# Patient Record
Sex: FEMALE | Race: BLACK OR AFRICAN AMERICAN | NOT HISPANIC OR LATINO | Employment: FULL TIME | URBAN - METROPOLITAN AREA
[De-identification: names, ages, dates, MRNs, and addresses within clinical notes are randomized per-mention and may not be internally consistent; named-entity substitution may affect disease eponyms.]

---

## 2021-03-08 PROBLEM — R42 DIZZINESS: Status: ACTIVE | Noted: 2021-03-08

## 2021-03-08 PROBLEM — G43.019 INTRACTABLE MIGRAINE WITHOUT AURA AND WITHOUT STATUS MIGRAINOSUS: Status: ACTIVE | Noted: 2021-03-08

## 2022-07-22 ENCOUNTER — HOSPITAL ENCOUNTER (EMERGENCY)
Facility: HOSPITAL | Age: 27
Discharge: HOME | End: 2022-07-22
Attending: STUDENT IN AN ORGANIZED HEALTH CARE EDUCATION/TRAINING PROGRAM
Payer: MEDICAID

## 2022-07-22 VITALS
WEIGHT: 161.63 LBS | TEMPERATURE: 98 F | RESPIRATION RATE: 16 BRPM | OXYGEN SATURATION: 99 % | SYSTOLIC BLOOD PRESSURE: 113 MMHG | HEIGHT: 64 IN | DIASTOLIC BLOOD PRESSURE: 79 MMHG | HEART RATE: 88 BPM | BODY MASS INDEX: 27.59 KG/M2

## 2022-07-22 DIAGNOSIS — R11.2 NON-INTRACTABLE VOMITING WITH NAUSEA, UNSPECIFIED VOMITING TYPE: ICD-10-CM

## 2022-07-22 DIAGNOSIS — R10.30 INTERMITTENT LOWER ABDOMINAL PAIN: Primary | ICD-10-CM

## 2022-07-22 LAB
ALBUMIN SERPL-MCNC: 4 GM/DL (ref 3.5–5)
ALBUMIN/GLOB SERPL: 1 RATIO (ref 1.1–2)
ALP SERPL-CCNC: 41 UNIT/L (ref 40–150)
ALT SERPL-CCNC: 18 UNIT/L (ref 0–55)
APPEARANCE UR: CLEAR
AST SERPL-CCNC: 15 UNIT/L (ref 5–34)
B-HCG UR QL: NEGATIVE
BACTERIA #/AREA URNS AUTO: ABNORMAL /HPF
BASOPHILS # BLD AUTO: 0.03 X10(3)/MCL (ref 0–0.2)
BASOPHILS NFR BLD AUTO: 0.6 %
BILIRUB UR QL STRIP.AUTO: NEGATIVE MG/DL
BILIRUBIN DIRECT+TOT PNL SERPL-MCNC: 0.5 MG/DL
BUN SERPL-MCNC: 10.6 MG/DL (ref 7–18.7)
CALCIUM SERPL-MCNC: 10.1 MG/DL (ref 8.4–10.2)
CHLORIDE SERPL-SCNC: 103 MMOL/L (ref 98–107)
CO2 SERPL-SCNC: 28 MMOL/L (ref 22–29)
COLOR UR AUTO: ABNORMAL
CREAT SERPL-MCNC: 0.76 MG/DL (ref 0.55–1.02)
CTP QC/QA: YES
EOSINOPHIL # BLD AUTO: 0.05 X10(3)/MCL (ref 0–0.9)
EOSINOPHIL NFR BLD AUTO: 0.9 %
ERYTHROCYTE [DISTWIDTH] IN BLOOD BY AUTOMATED COUNT: 14.6 % (ref 11.5–17)
GLOBULIN SER-MCNC: 3.9 GM/DL (ref 2.4–3.5)
GLUCOSE SERPL-MCNC: 82 MG/DL (ref 74–100)
GLUCOSE UR QL STRIP.AUTO: NORMAL MG/DL
HCT VFR BLD AUTO: 40.1 % (ref 37–47)
HGB BLD-MCNC: 12.8 GM/DL (ref 12–16)
HYALINE CASTS #/AREA URNS LPF: ABNORMAL /LPF
IMM GRANULOCYTES # BLD AUTO: 0 X10(3)/MCL (ref 0–0.04)
IMM GRANULOCYTES NFR BLD AUTO: 0 %
KETONES UR QL STRIP.AUTO: NEGATIVE MG/DL
LEUKOCYTE ESTERASE UR QL STRIP.AUTO: NEGATIVE UNIT/L
LIPASE SERPL-CCNC: 20 U/L
LYMPHOCYTES # BLD AUTO: 1.63 X10(3)/MCL (ref 0.6–4.6)
LYMPHOCYTES NFR BLD AUTO: 30.6 %
MCH RBC QN AUTO: 26 PG (ref 27–31)
MCHC RBC AUTO-ENTMCNC: 31.9 MG/DL (ref 33–36)
MCV RBC AUTO: 81.3 FL (ref 80–94)
MONOCYTES # BLD AUTO: 0.29 X10(3)/MCL (ref 0.1–1.3)
MONOCYTES NFR BLD AUTO: 5.5 %
NEUTROPHILS # BLD AUTO: 3.3 X10(3)/MCL (ref 2.1–9.2)
NEUTROPHILS NFR BLD AUTO: 62.4 %
NITRITE UR QL STRIP.AUTO: NEGATIVE
NRBC BLD AUTO-RTO: 0 %
PH UR STRIP.AUTO: 7 [PH]
PLATELET # BLD AUTO: 254 X10(3)/MCL (ref 130–400)
PMV BLD AUTO: 12.6 FL (ref 7.4–10.4)
POTASSIUM SERPL-SCNC: 4 MMOL/L (ref 3.5–5.1)
PROT SERPL-MCNC: 7.9 GM/DL (ref 6.4–8.3)
PROT UR QL STRIP.AUTO: NEGATIVE MG/DL
RBC # BLD AUTO: 4.93 X10(6)/MCL (ref 4.2–5.4)
RBC #/AREA URNS AUTO: ABNORMAL /HPF
RBC UR QL AUTO: NEGATIVE UNIT/L
SARS-COV-2 RDRP RESP QL NAA+PROBE: NEGATIVE
SODIUM SERPL-SCNC: 138 MMOL/L (ref 136–145)
SP GR UR STRIP.AUTO: 1.02
SQUAMOUS #/AREA URNS LPF: ABNORMAL /HPF
UROBILINOGEN UR STRIP-ACNC: NORMAL MG/DL
WBC # SPEC AUTO: 5.3 X10(3)/MCL (ref 4.5–11.5)
WBC #/AREA URNS AUTO: ABNORMAL /HPF

## 2022-07-22 PROCEDURE — 99284 EMERGENCY DEPT VISIT MOD MDM: CPT | Mod: 25

## 2022-07-22 PROCEDURE — 83690 ASSAY OF LIPASE: CPT | Performed by: PHYSICIAN ASSISTANT

## 2022-07-22 PROCEDURE — 81025 URINE PREGNANCY TEST: CPT | Performed by: PHYSICIAN ASSISTANT

## 2022-07-22 PROCEDURE — 63600175 PHARM REV CODE 636 W HCPCS: Performed by: PHYSICIAN ASSISTANT

## 2022-07-22 PROCEDURE — 80053 COMPREHEN METABOLIC PANEL: CPT | Performed by: PHYSICIAN ASSISTANT

## 2022-07-22 PROCEDURE — 25000003 PHARM REV CODE 250: Performed by: PHYSICIAN ASSISTANT

## 2022-07-22 PROCEDURE — 81001 URINALYSIS AUTO W/SCOPE: CPT | Performed by: PHYSICIAN ASSISTANT

## 2022-07-22 PROCEDURE — 85025 COMPLETE CBC W/AUTO DIFF WBC: CPT | Performed by: PHYSICIAN ASSISTANT

## 2022-07-22 PROCEDURE — 96372 THER/PROPH/DIAG INJ SC/IM: CPT | Performed by: PHYSICIAN ASSISTANT

## 2022-07-22 PROCEDURE — 36415 COLL VENOUS BLD VENIPUNCTURE: CPT | Performed by: PHYSICIAN ASSISTANT

## 2022-07-22 PROCEDURE — 87635 SARS-COV-2 COVID-19 AMP PRB: CPT | Performed by: PHYSICIAN ASSISTANT

## 2022-07-22 RX ORDER — DICYCLOMINE HYDROCHLORIDE 10 MG/1
10 CAPSULE ORAL 4 TIMES DAILY PRN
Qty: 20 CAPSULE | Refills: 0 | Status: SHIPPED | OUTPATIENT
Start: 2022-07-22

## 2022-07-22 RX ORDER — DICYCLOMINE HYDROCHLORIDE 10 MG/1
20 CAPSULE ORAL
Status: COMPLETED | OUTPATIENT
Start: 2022-07-22 | End: 2022-07-22

## 2022-07-22 RX ORDER — METOCLOPRAMIDE 10 MG/1
10 TABLET ORAL EVERY 6 HOURS PRN
Qty: 12 TABLET | Refills: 0 | Status: SHIPPED | OUTPATIENT
Start: 2022-07-22

## 2022-07-22 RX ORDER — METOCLOPRAMIDE HYDROCHLORIDE 5 MG/ML
10 INJECTION INTRAMUSCULAR; INTRAVENOUS
Status: COMPLETED | OUTPATIENT
Start: 2022-07-22 | End: 2022-07-22

## 2022-07-22 RX ADMIN — METOCLOPRAMIDE 10 MG: 5 INJECTION, SOLUTION INTRAMUSCULAR; INTRAVENOUS at 01:07

## 2022-07-22 RX ADMIN — DICYCLOMINE HYDROCHLORIDE 20 MG: 10 CAPSULE ORAL at 01:07

## 2022-07-22 NOTE — ED PROVIDER NOTES
Encounter Date: 2022       History     Chief Complaint   Patient presents with    Abdominal Pain    Generalized Body Aches     C/o lower abdominal pain and generalized body aches since sat. N/v  since sat.      Patient presents today c/o lower abdominal pain that started 1 week ago. Pain is intermittent. She also endorses nausea, vomiting, and generalized bodyaches. Last bm was this morning and was normal. She is tolerating PO. Denies fever, chills, diarrhea, constipation, dysuria, hematuria, vaginal discharge, vaginal bleeding, headache, uri symptoms.         Review of patient's allergies indicates:   Allergen Reactions    Ondansetron hcl Hives     Past Medical History:   Diagnosis Date    Anxiety     Headache      Past Surgical History:   Procedure Laterality Date     SECTION       Family History   Problem Relation Age of Onset    Cancer Maternal Aunt     Cancer Paternal Grandmother      Social History     Tobacco Use    Smoking status: Never Smoker    Smokeless tobacco: Never Used   Substance Use Topics    Alcohol use: Yes     Comment: social    Drug use: Never     Review of Systems   Constitutional: Negative for chills and fever.   Respiratory: Negative for chest tightness and shortness of breath.    Cardiovascular: Negative for chest pain, palpitations and leg swelling.   Gastrointestinal: Positive for abdominal pain, nausea and vomiting. Negative for constipation and diarrhea.   Genitourinary: Negative for dysuria, flank pain, hematuria, vaginal bleeding and vaginal discharge.   Musculoskeletal: Positive for arthralgias and myalgias. Negative for back pain.   Skin: Negative for rash.   Neurological: Negative for light-headedness and headaches.       Physical Exam     Initial Vitals [22 1155]   BP Pulse Resp Temp SpO2   130/84 86 20 97.5 °F (36.4 °C) 99 %      MAP       --         Physical Exam    Vitals reviewed.  Constitutional: She appears well-developed and well-nourished. She  is not diaphoretic. No distress.   HENT:   Head: Normocephalic and atraumatic.   Eyes: Conjunctivae and EOM are normal. Pupils are equal, round, and reactive to light.   Neck: Neck supple.   Normal range of motion.  Cardiovascular: Normal rate, regular rhythm, normal heart sounds and intact distal pulses.   Pulmonary/Chest: Breath sounds normal. No respiratory distress.   Abdominal: Abdomen is soft. Bowel sounds are normal. She exhibits no distension. There is no abdominal tenderness. There is no rebound and no guarding.   Musculoskeletal:         General: No edema. Normal range of motion.      Cervical back: Normal range of motion and neck supple.     Neurological: She is alert and oriented to person, place, and time. She has normal strength. GCS score is 15. GCS eye subscore is 4. GCS verbal subscore is 5. GCS motor subscore is 6.   Skin: Skin is warm and dry. Capillary refill takes less than 2 seconds. No rash noted.   Psychiatric: She has a normal mood and affect.         ED Course   Procedures  Labs Reviewed   COMPREHENSIVE METABOLIC PANEL - Abnormal; Notable for the following components:       Result Value    Globulin 3.9 (*)     Albumin/Globulin Ratio 1.0 (*)     All other components within normal limits   URINALYSIS, REFLEX TO URINE CULTURE - Abnormal; Notable for the following components:    Color, UA Light-Yellow (*)     Bacteria, UA Trace (*)     Squamous Epithelial Cells, UA Occ (*)     All other components within normal limits   CBC WITH DIFFERENTIAL - Abnormal; Notable for the following components:    MCH 26.0 (*)     MCHC 31.9 (*)     MPV 12.6 (*)     All other components within normal limits   LIPASE - Normal   SARS-COV-2 RNA AMPLIFICATION, QUAL - Normal   CBC W/ AUTO DIFFERENTIAL    Narrative:     The following orders were created for panel order CBC auto differential.  Procedure                               Abnormality         Status                     ---------                                -----------         ------                     CBC with Differential[[id number]]        Abnormal            Final result                 Please view results for these tests on the individual orders.   POCT URINE PREGNANCY          Imaging Results          X-Ray Abdomen Flat And Erect (Final result)  Result time 07/22/22 13:50:20    Final result by Reddy Armas MD (07/22/22 13:50:20)                 Impression:      Nonobstructive bowel gas pattern.      Electronically signed by: Reddy Armas  Date:    07/22/2022  Time:    13:50             Narrative:    EXAMINATION:  XR ABDOMEN FLAT AND ERECT    CLINICAL HISTORY:  Unspecified abdominal pain    TECHNIQUE:  Flat and erect AP views of the abdomen.    COMPARISON:  Radiography 09/21/2019    FINDINGS:  Clear lung bases.  No dilated bowel, significant air-fluid levels or definitive findings for pneumoperitoneum.  Small to moderate volume stool.                                 Medications   metoclopramide HCl injection 10 mg (10 mg Intramuscular Given 7/22/22 1308)   dicyclomine capsule 20 mg (20 mg Oral Given 7/22/22 1308)           APC / Resident Notes:   I was not physically present during the history, exam or disposition of this patient. I was available at all times for consultation. (Zmora)        ED Course as of 07/22/22 2220 Fri Jul 22, 2022   [id number] Patient re-evaluated at this time. She is resting comfortably in exam room. She says her pain and nausea have improved. No acute abdomen on exam. She is stable for discharge.  [SA]      ED Course User Index  [SA] CADE Sabillon             Clinical Impression:   Final diagnoses:  [R10.30] Intermittent lower abdominal pain (Primary)  [R11.2] Non-intractable vomiting with nausea, unspecified vomiting type        ED Disposition Condition    Discharge Stable        ED Prescriptions     Medication Sig Dispense Start Date End Date Auth. Provider    metoclopramide HCl (REGLAN) 10 MG tablet Take 1 tablet (10 mg  total) by mouth every 6 (six) hours as needed (nausea and vomiting). 12 tablet 7/22/2022  CADE Sabillon    dicyclomine (BENTYL) 10 MG capsule Take 1 capsule (10 mg total) by mouth 4 (four) times daily as needed (abdominal cramping). 20 capsule 7/22/2022  CADE Sabillon        Follow-up Information     Follow up With Specialties Details Why Contact Info    [facility] Dept Emergency Medicine  If symptoms worsen return to ED immediately [address] [facility] [address] [phone]    Primary Care Provider within 1-2 days  Go in 1 day             CADE Sabillon  07/22/22 1655       Messi Peacock MD  07/22/22 0074

## 2023-03-07 ENCOUNTER — HOSPITAL ENCOUNTER (EMERGENCY)
Facility: HOSPITAL | Age: 28
Discharge: HOME | End: 2023-03-07
Attending: STUDENT IN AN ORGANIZED HEALTH CARE EDUCATION/TRAINING PROGRAM
Payer: COMMERCIAL

## 2023-03-07 VITALS
TEMPERATURE: 99 F | BODY MASS INDEX: 28.51 KG/M2 | SYSTOLIC BLOOD PRESSURE: 146 MMHG | DIASTOLIC BLOOD PRESSURE: 98 MMHG | OXYGEN SATURATION: 99 % | HEIGHT: 64 IN | RESPIRATION RATE: 18 BRPM | WEIGHT: 167 LBS | HEART RATE: 91 BPM

## 2023-03-07 DIAGNOSIS — G89.29 CHRONIC PELVIC PAIN IN FEMALE: Primary | ICD-10-CM

## 2023-03-07 DIAGNOSIS — N83.201 CYSTS OF BOTH OVARIES: ICD-10-CM

## 2023-03-07 DIAGNOSIS — N83.202 CYSTS OF BOTH OVARIES: ICD-10-CM

## 2023-03-07 DIAGNOSIS — R10.2 CHRONIC PELVIC PAIN IN FEMALE: Primary | ICD-10-CM

## 2023-03-07 LAB
ALBUMIN SERPL-MCNC: 3.7 G/DL (ref 3.5–5)
ALBUMIN/GLOB SERPL: 1 RATIO (ref 1.1–2)
ALP SERPL-CCNC: 58 UNIT/L (ref 40–150)
ALT SERPL-CCNC: 16 UNIT/L (ref 0–55)
APPEARANCE UR: CLEAR
AST SERPL-CCNC: 14 UNIT/L (ref 5–34)
B-HCG SERPL QL: NEGATIVE
BACTERIA #/AREA URNS AUTO: NORMAL /HPF
BASOPHILS # BLD AUTO: 0.04 X10(3)/MCL (ref 0–0.2)
BASOPHILS NFR BLD AUTO: 0.6 %
BILIRUB UR QL STRIP.AUTO: NEGATIVE MG/DL
BILIRUBIN DIRECT+TOT PNL SERPL-MCNC: 0.4 MG/DL
BUN SERPL-MCNC: 10.1 MG/DL (ref 7–18.7)
CALCIUM SERPL-MCNC: 9.2 MG/DL (ref 8.4–10.2)
CHLORIDE SERPL-SCNC: 107 MMOL/L (ref 98–107)
CO2 SERPL-SCNC: 27 MMOL/L (ref 22–29)
COLOR UR AUTO: YELLOW
CREAT SERPL-MCNC: 0.82 MG/DL (ref 0.55–1.02)
EOSINOPHIL # BLD AUTO: 0.07 X10(3)/MCL (ref 0–0.9)
EOSINOPHIL NFR BLD AUTO: 1 %
ERYTHROCYTE [DISTWIDTH] IN BLOOD BY AUTOMATED COUNT: 15.4 % (ref 11.5–17)
GFR SERPLBLD CREATININE-BSD FMLA CKD-EPI: >60 MLS/MIN/1.73/M2
GLOBULIN SER-MCNC: 3.7 GM/DL (ref 2.4–3.5)
GLUCOSE SERPL-MCNC: 92 MG/DL (ref 74–100)
GLUCOSE UR QL STRIP.AUTO: NEGATIVE MG/DL
HCT VFR BLD AUTO: 38.7 % (ref 37–47)
HGB BLD-MCNC: 12.2 G/DL (ref 12–16)
IMM GRANULOCYTES # BLD AUTO: 0.02 X10(3)/MCL (ref 0–0.04)
IMM GRANULOCYTES NFR BLD AUTO: 0.3 %
KETONES UR QL STRIP.AUTO: NEGATIVE MG/DL
LEUKOCYTE ESTERASE UR QL STRIP.AUTO: NEGATIVE UNIT/L
LYMPHOCYTES # BLD AUTO: 2.12 X10(3)/MCL (ref 0.6–4.6)
LYMPHOCYTES NFR BLD AUTO: 29.5 %
MCH RBC QN AUTO: 25.9 PG
MCHC RBC AUTO-ENTMCNC: 31.5 G/DL (ref 33–36)
MCV RBC AUTO: 82.2 FL (ref 80–94)
MONOCYTES # BLD AUTO: 0.47 X10(3)/MCL (ref 0.1–1.3)
MONOCYTES NFR BLD AUTO: 6.5 %
NEUTROPHILS # BLD AUTO: 4.47 X10(3)/MCL (ref 2.1–9.2)
NEUTROPHILS NFR BLD AUTO: 62.1 %
NITRITE UR QL STRIP.AUTO: NEGATIVE
NRBC BLD AUTO-RTO: 0 %
PH UR STRIP.AUTO: 6.5 [PH]
PLATELET # BLD AUTO: 208 X10(3)/MCL (ref 130–400)
PMV BLD AUTO: 12.1 FL (ref 7.4–10.4)
POTASSIUM SERPL-SCNC: 3.8 MMOL/L (ref 3.5–5.1)
PROT SERPL-MCNC: 7.4 GM/DL (ref 6.4–8.3)
PROT UR QL STRIP.AUTO: NEGATIVE MG/DL
RBC # BLD AUTO: 4.71 X10(6)/MCL (ref 4.2–5.4)
RBC #/AREA URNS AUTO: <5 /HPF
RBC UR QL AUTO: NEGATIVE UNIT/L
SODIUM SERPL-SCNC: 139 MMOL/L (ref 136–145)
SP GR UR STRIP.AUTO: 1.01 (ref 1–1.03)
SQUAMOUS #/AREA URNS AUTO: <5 /HPF
UROBILINOGEN UR STRIP-ACNC: 1 MG/DL
WBC # SPEC AUTO: 7.2 X10(3)/MCL (ref 4.5–11.5)
WBC #/AREA URNS AUTO: <5 /HPF

## 2023-03-07 PROCEDURE — 81025 URINE PREGNANCY TEST: CPT | Performed by: PHYSICIAN ASSISTANT

## 2023-03-07 PROCEDURE — 80053 COMPREHEN METABOLIC PANEL: CPT | Performed by: PHYSICIAN ASSISTANT

## 2023-03-07 PROCEDURE — 96372 THER/PROPH/DIAG INJ SC/IM: CPT | Performed by: PHYSICIAN ASSISTANT

## 2023-03-07 PROCEDURE — 81001 URINALYSIS AUTO W/SCOPE: CPT | Performed by: PHYSICIAN ASSISTANT

## 2023-03-07 PROCEDURE — 63600175 PHARM REV CODE 636 W HCPCS: Performed by: PHYSICIAN ASSISTANT

## 2023-03-07 PROCEDURE — 85025 COMPLETE CBC W/AUTO DIFF WBC: CPT | Performed by: PHYSICIAN ASSISTANT

## 2023-03-07 PROCEDURE — 99285 EMERGENCY DEPT VISIT HI MDM: CPT | Mod: 25

## 2023-03-07 RX ORDER — HYDROCODONE BITARTRATE AND ACETAMINOPHEN 5; 325 MG/1; MG/1
1 TABLET ORAL EVERY 8 HOURS PRN
Qty: 6 TABLET | Refills: 0 | Status: SHIPPED | OUTPATIENT
Start: 2023-03-07 | End: 2023-03-09

## 2023-03-07 RX ORDER — KETOROLAC TROMETHAMINE 30 MG/ML
30 INJECTION, SOLUTION INTRAMUSCULAR; INTRAVENOUS
Status: COMPLETED | OUTPATIENT
Start: 2023-03-07 | End: 2023-03-07

## 2023-03-07 RX ADMIN — KETOROLAC TROMETHAMINE 30 MG: 30 INJECTION, SOLUTION INTRAMUSCULAR; INTRAVENOUS at 10:03

## 2023-03-07 NOTE — FIRST PROVIDER EVALUATION
"Medical screening examination initiated.  I have conducted a focused provider triage encounter, findings are as follows:    Brief history of present illness:  HPI     Abdominal Pain     Additional comments: Pt reports pelvic pain and generalized back pain x4   days. Has hx of cyst on bother ovaries and was told to come to ED. Pt   reports increase in discharge, denied vaginal bleeding. LMP 1/28/23 denied   BC or taking UPT          Last edited by Roxana Katz RN on 3/7/2023  4:40 PM.          Vitals:    03/07/23 1640   BP: (!) 146/98   Pulse: 91   Resp: 18   Temp: 98.6 °F (37 °C)   TempSrc: Oral   SpO2: 99%   Weight: 75.8 kg (167 lb)   Height: 5' 4" (1.626 m)       Pertinent physical exam:  AAOx4 female ambulatory into triage    Brief workup plan:  labs, UA    Preliminary workup initiated; this workup will be continued and followed by the physician or advanced practice provider that is assigned to the patient when roomed.  "

## 2023-03-08 NOTE — ED PROVIDER NOTES
Encounter Date: 3/7/2023       History     Chief Complaint   Patient presents with    Abdominal Pain     Pt reports pelvic pain and generalized back pain x4 days. Has hx of cyst on bother ovaries and was told to come to ED. Pt reports increase in discharge, denied vaginal bleeding. LMP 23 denied BC or taking UPT     27-year-old female with a history of ovarian cysts presents to the ED with bilateral pelvic pain radiating to lower back onset 4 days ago.  Patient states she is trying to see your OBGYN this coming Thursday however is having no relief at at-home Toradol.  Denies nausea, vomiting, dysuria, discharge, hematuria, fever, chills.  No known injury or trauma. LMP , states she is not regular     The history is provided by the patient. No  was used.   Review of patient's allergies indicates:   Allergen Reactions    Ondansetron hcl Hives     Past Medical History:   Diagnosis Date    Anxiety     Headache      Past Surgical History:   Procedure Laterality Date     SECTION       Family History   Problem Relation Age of Onset    Cancer Maternal Aunt     Cancer Paternal Grandmother      Social History     Tobacco Use    Smoking status: Never    Smokeless tobacco: Never   Substance Use Topics    Alcohol use: Yes     Comment: social    Drug use: Never     Review of Systems   Constitutional:  Negative for fever.   HENT:  Negative for sore throat.    Respiratory:  Negative for shortness of breath.    Cardiovascular:  Negative for chest pain.   Gastrointestinal:  Positive for abdominal pain. Negative for nausea and vomiting.   Genitourinary:  Positive for pelvic pain. Negative for dysuria and vaginal discharge.   Musculoskeletal:  Positive for back pain.   Skin:  Negative for rash.   Neurological:  Negative for weakness.   Hematological:  Does not bruise/bleed easily.   All other systems reviewed and are negative.    Physical Exam     Initial Vitals [23 1640]   BP Pulse Resp Temp  Referred by: Juma Jerome PT; Medical Diagnosis (from order):    Diagnosis Information      Diagnosis    726.10 (ICD-9-CM) - M77.8 (ICD-10-CM) - Tendinitis of left shoulder                Daily Treatment Note    Visit:  6     SUBJECTIVE                                                                                                             Patient comes in today stating that the shoulder has felt really good the past 24 hours. He had very little discomfort after the Last session and then returning to activities at school that day.   Functional Change: At practice on Monday he thru long toss with out any issues. Has resisted the urge to throw hard and has stayed off the mound since last Saturday. Continues to ice regularly and do HEP as well as arm care program    Pain / Symptoms:  Pain/symptom is: intermittent  Pain rating (out of 10): Current: 0 ; Best: 0  Progression since onset: improved    OBJECTIVE                                                                                                                     Observation:   Comments / Details: Continues to have tenderness over the long head of the tendon but more force has to be done to produce the tenderness.         TREATMENT                                                                                                                  Therapeutic Exercise:  UBE 4 min easy warm up  TRX stretching for anterior shoulder and chest  Biomechanical review and cueing of the overhead high speed motion of the arm  Lat pulldown 54# 3 x 10  Standing rows 10 # 3 x 10  Tubing extensions Blue 1 x25  DB prone horizontal extension 3#    Manual Therapy:  STM to the anterior shoulder with focus on the biceps tendons  Global stretching to shoulder      Skilled input: verbal instruction/cues, posture correction and tactile instruction/cues    Writer verbally educated and received verbal consent for hand placement, positioning of patient, and techniques to be performed  SpO2   (!) 146/98 91 18 98.6 °F (37 °C) 99 %      MAP       --         Physical Exam    Nursing note and vitals reviewed.  Constitutional: She appears well-developed and well-nourished.   HENT:   Head: Normocephalic and atraumatic.   Eyes: EOM are normal. Pupils are equal, round, and reactive to light.   Neck: Neck supple.   Cardiovascular:  Normal rate, regular rhythm and normal heart sounds.           Pulmonary/Chest: Breath sounds normal.   Abdominal: Abdomen is soft. Bowel sounds are normal. She exhibits no distension. There is abdominal tenderness in the right lower quadrant, suprapubic area and left lower quadrant. There is no rebound and no guarding.   Musculoskeletal:         General: Normal range of motion.      Cervical back: Normal and neck supple.      Thoracic back: Normal.      Lumbar back: Tenderness present. No spasms or bony tenderness. Normal range of motion.     Neurological: She is alert and oriented to person, place, and time. She has normal strength. GCS score is 15. GCS eye subscore is 4. GCS verbal subscore is 5. GCS motor subscore is 6.   Skin: Skin is warm and dry.   Psychiatric: She has a normal mood and affect.       ED Course   Procedures  Labs Reviewed   COMPREHENSIVE METABOLIC PANEL - Abnormal; Notable for the following components:       Result Value    Globulin 3.7 (*)     Albumin/Globulin Ratio 1.0 (*)     All other components within normal limits   CBC WITH DIFFERENTIAL - Abnormal; Notable for the following components:    MCHC 31.5 (*)     MPV 12.1 (*)     All other components within normal limits   URINALYSIS, REFLEX TO URINE CULTURE - Normal   PREGNANCY TEST, URINE RAPID - Normal   URINALYSIS, MICROSCOPIC - Normal   CBC W/ AUTO DIFFERENTIAL    Narrative:     The following orders were created for panel order CBC auto differential.  Procedure                               Abnormality         Status                     ---------                               -----------          today from patient for clothing adjustments for techniques and hand placement and palpation for techniques as described above and how they are pertinent to the patient's plan of care.    Home Exercise Program: Daily stretches and arm care program  Ice regularly   Continue to ease back into throwing 30-40 throws this week     ASSESSMENT                                                                                                             Patient has returned to activity/ADLs at 65% intensity  Decreased scapular endurance and iosolated inflammation of biceps (improving) limiting progression to higher intensity  Shoulder showing decrease in inflammation thus decreased pain.   He still does not have complete strength of the shoulder complex as of yet.  Pain/symptoms after session: 0    Patient Education:   Results of above outlined education: Verbalizes understanding and Demonstrates understanding      PLAN                                                                                                                           Suggestions for next session as indicated: Progress per plan of care: continue to build endurance I back into the shoulder. No hard throwing for one more week. May begin to cut down visits to 1 time a week over the next 2 weeks.    Complete strength testing assessment of shoulder complex via MMT and/or digital         Therapy procedure time and total treatment time can be found documented on the Time Entry flowsheet   ------                     CBC with Differential[[id number]]        Abnormal            Final result                 Please view results for these tests on the individual orders.          Imaging Results              US Pelvis Comp with Transvag NON-OB (xpd (Final result)  Result time 03/07/23 21:06:11      Final result by Aime Jackson MD (03/07/23 21:06:11)                   Impression:      1. Right ovarian multiple variable size cystic structures.  Due to multiplicity follow-up exam in 6 weeks shall be useful.    2. Left ovarian small physiologic cyst.      Electronically signed by: Aime Jackson  Date:    03/07/2023  Time:    21:06               Narrative:    EXAMINATION:  US PELVIS COMP WITH TRANSVAG NON-OB (XPD)    CLINICAL HISTORY:  pelvic pain;    TECHNIQUE:  Multiple real-time images were performed pelvis in various planes by the sonographer.    COMPARISON:  None available.    FINDINGS:  Uterus measures 6.3 x 3.9 x 5.2 cm.  Uterine myometrial echotexture is unremarkable.  Endometrial thickness of 4.1 mm is within normal limits.    Right ovary measures 3.2 x 1.6 x 3.2 cm and is remarkable for multiple cystic structures.  Right ovarian larger cystic structures measure 1.3 x 0.7 x 0.9 cm and 1.4 x 1.1 x 1.2 cm.  Unremarkable arteriovenous flow to the right ovary.    Left ovary measures 2.6 x 2.4 x 2.7 cm.  There is also left ovarian simple appearing cystic structure which measures 0.8 x 0.6 x 0.7 cm.  There is also unremarkable arteriovenous flow to the left ovary.    No free fluid within the pelvis.                                       Medications   ketorolac injection 30 mg (30 mg Intramuscular Given 3/7/23 2223)     Medical Decision Making:   History:   Old Medical Records: I decided to obtain old medical records.  Old Records Summarized: records from another hospital and other records.       <> Summary of Records: Multiple previous ER in clinic visits over the last several years for similar or exact same  "complaints.  Initial Assessment:   27-year-old female with a history of ovarian cysts presents to the ED with bilateral pelvic pain radiating to lower back onset 4 days ago.  Patient states she is trying to see your OBGYN this coming Thursday however is having no relief at at-home Toradol.  Denies nausea, vomiting, dysuria, discharge, hematuria, fever, chills.  No known injury or trauma. LMP 1/28, states she is not regular   Differential Diagnosis:   UTI, ovarian cyst versus ovarian torsion, nephrolithiasis, pyelonephritis, muscle strain  Clinical Tests:   Lab Tests: Reviewed and Ordered  Radiological Study: Reviewed and Ordered  ED Management:  Labs unremarkable.  Ultrasound noting bilateral ovarian cyst however no other abnormalities.  Given Toradol here in the ED.  Has appointment to see her OBGYN this coming Thursday.  Will send home with very short course of Norco however in light of this being a chronic issue, I did instruct her to follow up with the OBGYN for further recommendations.           ED Course as of 03/07/23 2252   Tue Mar 07, 2023   2244 Upon discharge, patient refusing to leave the room until "I am out of pain."  I discussed with her that I sent her a few tablets of Ashley to her pharmacy for her to take at home for severe pain.  I discussed with her that this is a chronic issue she has been seen for multiple times over the last several years.  I discussed that we know with the issue is however I can not fix her ovarian cysts here in the ED today and that she needs to follow up with her OBGYN for a solution.  Patient is adamant and angry that she has been here for "hours in pain. " I tried to discuss with her that we are very busy and we are moving as fast as possible to help make sure she and all the other patients are taken care of in a safe and timely fashion.  I did reiterate again that all of her labs, urine and ultrasound were unremarkable for any surgical emergency or infection.  I did " discuss that they again saw her ovarian cysts however no other signs of infection or surgical emergency.  Patient on the phone with someone, had them on speaker during the entire discussion.  Jami RN, at bedside as well.  Patient asking for discharge paperwork and leaving. [MA]      ED Course User Index  [MA] Montrell Patiño PA-C                 Clinical Impression:   Final diagnoses:  [R10.2, G89.29] Chronic pelvic pain in female (Primary)  [N83.201, N83.202] Cysts of both ovaries        ED Disposition Condition    Discharge Stable          ED Prescriptions       Medication Sig Dispense Start Date End Date Auth. Provider    HYDROcodone-acetaminophen (NORCO) 5-325 mg per tablet Take 1 tablet by mouth every 8 (eight) hours as needed for Pain. 6 tablet 3/7/2023 3/9/2023 Montrell Patiño PA-C          Follow-up Information       Follow up With Specialties Details Why Contact Info    Jenna Burdick MD Obstetrics and Gynecology On 3/9/2023  [address] [facility] [address]  [phone]      [facility] - Emergency Dept Emergency Medicine In 1 week If symptoms worsen [address] [facility] [address] [phone]             Montrell Patiño PA-C  03/07/23 2237       Montrell Patiño PA-C  03/07/23 2238       Montrell Patiño PA-C  03/07/23 1958

## 2024-08-25 ENCOUNTER — HOSPITAL ENCOUNTER (EMERGENCY)
Facility: HOSPITAL | Age: 29
Discharge: HOME | End: 2024-08-25
Attending: EMERGENCY MEDICINE
Payer: MEDICAID

## 2024-08-25 VITALS
HEIGHT: 64 IN | WEIGHT: 187.25 LBS | RESPIRATION RATE: 18 BRPM | BODY MASS INDEX: 31.97 KG/M2 | OXYGEN SATURATION: 99 % | DIASTOLIC BLOOD PRESSURE: 85 MMHG | TEMPERATURE: 98 F | HEART RATE: 88 BPM | SYSTOLIC BLOOD PRESSURE: 137 MMHG

## 2024-08-25 DIAGNOSIS — R10.9 ABDOMINAL PAIN, UNSPECIFIED ABDOMINAL LOCATION: Primary | ICD-10-CM

## 2024-08-25 LAB
ABS NEUT CALC (OHS): 8.3 X10(3)/MCL (ref 2.1–9.2)
ANION GAP SERPL CALC-SCNC: 8 MEQ/L
B-HCG UR QL: NEGATIVE
BACTERIA #/AREA URNS AUTO: ABNORMAL /HPF
BASOPHILS NFR BLD MANUAL: 0.11 X10(3)/MCL (ref 0–0.2)
BASOPHILS NFR BLD MANUAL: 1 % (ref 0–2)
BILIRUB UR QL STRIP.AUTO: NEGATIVE
BUN SERPL-MCNC: 11.7 MG/DL (ref 7–18.7)
CALCIUM SERPL-MCNC: 9.7 MG/DL (ref 8.4–10.2)
CHLORIDE SERPL-SCNC: 105 MMOL/L (ref 98–107)
CLARITY UR: CLEAR
CO2 SERPL-SCNC: 24 MMOL/L (ref 22–29)
COLOR UR AUTO: ABNORMAL
CREAT SERPL-MCNC: 1.03 MG/DL (ref 0.55–1.02)
CREAT/UREA NIT SERPL: 11
CTP QC/QA: YES
ERYTHROCYTE [DISTWIDTH] IN BLOOD BY AUTOMATED COUNT: 14.3 % (ref 11.5–17)
GFR SERPLBLD CREATININE-BSD FMLA CKD-EPI: >60 ML/MIN/1.73/M2
GLUCOSE SERPL-MCNC: 103 MG/DL (ref 74–100)
GLUCOSE UR QL STRIP: NORMAL
HCT VFR BLD AUTO: 38.6 % (ref 37–47)
HGB BLD-MCNC: 12.6 G/DL (ref 12–16)
HGB UR QL STRIP: NEGATIVE
HOLD SPECIMEN: NORMAL
HYALINE CASTS #/AREA URNS LPF: ABNORMAL /LPF
KETONES UR QL STRIP: ABNORMAL
LEUKOCYTE ESTERASE UR QL STRIP: NEGATIVE
LYMPHOCYTES NFR BLD MANUAL: 1.83 X10(3)/MCL
LYMPHOCYTES NFR BLD MANUAL: 17 % (ref 13–40)
MAGNESIUM SERPL-MCNC: 2 MG/DL (ref 1.6–2.6)
MCH RBC QN AUTO: 25.6 PG (ref 27–31)
MCHC RBC AUTO-ENTMCNC: 32.6 G/DL (ref 33–36)
MCV RBC AUTO: 78.5 FL (ref 80–94)
MONOCYTES NFR BLD MANUAL: 0.54 X10(3)/MCL (ref 0.1–1.3)
MONOCYTES NFR BLD MANUAL: 5 % (ref 2–11)
MUCOUS THREADS URNS QL MICRO: ABNORMAL /LPF
NEUTROPHILS NFR BLD MANUAL: 76 % (ref 47–80)
NEUTS BAND NFR BLD MANUAL: 1 % (ref 0–11)
NITRITE UR QL STRIP: NEGATIVE
NRBC BLD AUTO-RTO: 0 %
PH UR STRIP: 6.5 [PH]
PLATELET # BLD AUTO: 276 X10(3)/MCL (ref 130–400)
PLATELET # BLD EST: ADEQUATE 10*3/UL
PMV BLD AUTO: 12.2 FL (ref 7.4–10.4)
POTASSIUM SERPL-SCNC: 4 MMOL/L (ref 3.5–5.1)
PROT UR QL STRIP: ABNORMAL
RBC # BLD AUTO: 4.92 X10(6)/MCL (ref 4.2–5.4)
RBC #/AREA URNS AUTO: ABNORMAL /HPF
RBC MORPH BLD: NORMAL
SODIUM SERPL-SCNC: 137 MMOL/L (ref 136–145)
SP GR UR STRIP.AUTO: 1.04 (ref 1–1.03)
SQUAMOUS #/AREA URNS LPF: ABNORMAL /HPF
UROBILINOGEN UR STRIP-ACNC: ABNORMAL
WBC # BLD AUTO: 10.78 X10(3)/MCL (ref 4.5–11.5)
WBC #/AREA URNS AUTO: ABNORMAL /HPF

## 2024-08-25 PROCEDURE — 81001 URINALYSIS AUTO W/SCOPE: CPT | Performed by: EMERGENCY MEDICINE

## 2024-08-25 PROCEDURE — 80048 BASIC METABOLIC PNL TOTAL CA: CPT | Performed by: EMERGENCY MEDICINE

## 2024-08-25 PROCEDURE — 85027 COMPLETE CBC AUTOMATED: CPT | Performed by: EMERGENCY MEDICINE

## 2024-08-25 PROCEDURE — 83735 ASSAY OF MAGNESIUM: CPT | Performed by: EMERGENCY MEDICINE

## 2024-08-25 PROCEDURE — 99285 EMERGENCY DEPT VISIT HI MDM: CPT | Mod: 25

## 2024-08-25 PROCEDURE — 25500020 PHARM REV CODE 255

## 2024-08-25 PROCEDURE — 81025 URINE PREGNANCY TEST: CPT | Performed by: EMERGENCY MEDICINE

## 2024-08-25 RX ADMIN — IOHEXOL 100 ML: 350 INJECTION, SOLUTION INTRAVENOUS at 07:08

## 2024-08-25 NOTE — ED PROVIDER NOTES
"ED PROVIDER NOTE  2024    CHIEF COMPLAINT:   Chief Complaint   Patient presents with    Abdominal Pain     Lower abd pain and pelvic "pulling" type of pain since this morning. Denies n/v/d/urinary complaints.        HISTORY OF PRESENT ILLNESS:   Chinyere Osorio is a 29 y.o. female who presents with chief complaint Abdominal pain.  Onset was today whenever she began having suprapubic and left lower quadrant abdominal pain that has constant aggravated with walking feels like a sharp stabbing pain each time she steps onto her left foot states she feels like she has to poop because she can feel the pain in her rectum.  Denies nausea, vomiting, diarrhea, constipation, abnormal vaginal bleeding or discharge, dysuria, hematuria.    The history is provided by the patient.         REVIEW OF SYSTEMS: as noted in the HPI.  NURSING NOTES REVIEWED      PAST MEDICAL/SURGICAL HISTORY:   Past Medical History:   Diagnosis Date    Anxiety     Headache       Past Surgical History:   Procedure Laterality Date     SECTION         FAMILY HISTORY:   Family History   Problem Relation Name Age of Onset    Cancer Maternal Aunt      Cancer Paternal Grandmother         SOCIAL HISTORY:   Social History     Tobacco Use    Smoking status: Never    Smokeless tobacco: Never   Substance Use Topics    Alcohol use: Yes     Comment: social    Drug use: Never       ALLERGIES:   Review of patient's allergies indicates:   Allergen Reactions    Ondansetron hcl Hives       PHYSICAL EXAM:  Initial Vitals [24 1740]   BP Pulse Resp Temp SpO2   134/83 90 18 97.6 °F (36.4 °C) 100 %      MAP       --         Physical Exam    Nursing note and vitals reviewed.  Constitutional: She appears well-developed and well-nourished.   HENT:   Head: Normocephalic and atraumatic.   Mouth/Throat: Uvula is midline and mucous membranes are normal.   Eyes: EOM are normal. Pupils are equal, round, and reactive to light.   Neck: Trachea normal. Neck supple. "   Cardiovascular:  Normal rate, regular rhythm and normal pulses.           Pulmonary/Chest: Effort normal and breath sounds normal.   Abdominal: Abdomen is soft. Bowel sounds are normal. There is abdominal tenderness in the suprapubic area and left lower quadrant. There is no rebound and no guarding.   Musculoskeletal:         General: Normal range of motion.      Cervical back: Neck supple.     Neurological: She is alert and oriented to person, place, and time. GCS eye subscore is 4. GCS verbal subscore is 5. GCS motor subscore is 6.   Skin: Skin is warm and dry.   Psychiatric: She has a normal mood and affect. Her speech is normal. Thought content normal.         RESULTS:  Labs Reviewed   BASIC METABOLIC PANEL - Abnormal       Result Value    Sodium 137      Potassium 4.0      Chloride 105      CO2 24      Glucose 103 (*)     Blood Urea Nitrogen 11.7      Creatinine 1.03 (*)     BUN/Creatinine Ratio 11      Calcium 9.7      Anion Gap 8.0      eGFR >60     URINALYSIS, REFLEX TO URINE CULTURE - Abnormal    Color, UA Light-Yellow      Appearance, UA Clear      Specific Gravity, UA 1.040 (*)     pH, UA 6.5      Protein, UA 1+ (*)     Glucose, UA Normal      Ketones, UA Trace (*)     Blood, UA Negative      Bilirubin, UA Negative      Urobilinogen, UA 1+ (*)     Nitrites, UA Negative      Leukocyte Esterase, UA Negative      RBC, UA 0-5      WBC, UA 0-5      Bacteria, UA None Seen      Squamous Epithelial Cells, UA Few (*)     Mucous, UA Occ (*)     Hyaline Casts, UA None Seen     CBC WITH DIFFERENTIAL - Abnormal    WBC 10.78      RBC 4.92      Hgb 12.6      Hct 38.6      MCV 78.5 (*)     MCH 25.6 (*)     MCHC 32.6 (*)     RDW 14.3      Platelet 276      MPV 12.2 (*)     NRBC% 0.0     MAGNESIUM - Normal    Magnesium Level 2.00     MANUAL DIFFERENTIAL - Normal    Neutrophils % 76      Bands % 1      Lymphs % 17      Monocytes % 5      Basophils % 1      Neutrophils Abs Calc 8.3006      Basophils Abs 0.1078      Lymphs  Abs 1.8326      Monocytes Abs 0.539      Platelets Adequate      RBC Morph Normal     CBC W/ AUTO DIFFERENTIAL    Narrative:     The following orders were created for panel order CBC auto differential.  Procedure                               Abnormality         Status                     ---------                               -----------         ------                     CBC with Differential[[id number]]       Abnormal            Final result               Manual Differential[[id number]]         Normal              Final result                 Please view results for these tests on the individual orders.   EXTRA TUBES    Narrative:     The following orders were created for panel order EXTRA TUBES.  Procedure                               Abnormality         Status                     ---------                               -----------         ------                     Light Blue Top Hold[[id number]]                             Final result               Lavender Top Hold[[id number]]                               Final result               Gold Top Hold[[id number]]                                   Final result                 Please view results for these tests on the individual orders.   LIGHT BLUE TOP HOLD    Extra Tube Hold for add-ons.     LAVENDER TOP HOLD    Extra Tube Hold for add-ons.     GOLD TOP HOLD    Extra Tube Hold for add-ons.     POCT URINE PREGNANCY    POC Preg Test, Ur Negative       Acceptable Yes       Imaging Results              CT Abdomen Pelvis With IV Contrast NO Oral Contrast (Final result)  Result time 08/25/24 19:12:21      Final result by Rigo Lozada MD (08/25/24 19:12:21)                   Impression:      Diverticulosis without evidence of acute diverticulitis    No acute abnormalities are seen    Hiatal hernia      Electronically signed by: Rigo Lozada MD  Date:    08/25/2024  Time:    19:12               Narrative:    EXAMINATION:  CT ABDOMEN PELVIS WITH IV  CONTRAST    CLINICAL HISTORY:  LLQ abdominal pain;    TECHNIQUE:  Low dose axial images, sagittal and coronal reformations were obtained from the lung bases to the pubic symphysis following the IV administration of 100 mL of Omnipaque 350 no oral contrast is given.    Automatic exposure control (AEC) was utilized for dose reduction.    Dose: 390 the mGycm    COMPARISON:  None.    FINDINGS:  Lung bases appear clear.  There is a hiatal hernia present.  Liver appears normal.  Spleen appears normal.  Pancreas appears normal.  Biliary system appears normal.  The adrenals are not enlarged.  Kidneys appear normal.  Aorta shows no evidence of an aneurysm.  The appendix appears normal.  Uterus appears normal.  There are diverticulum in the colon without evidence of acute diverticulitis                                      PROCEDURES:  Procedures    ECG:       ED COURSE AND MEDICAL DECISION MAKING:  Medications   iohexoL (OMNIPAQUE 350) 350 mg iodine/mL injection (100 mLs  Given 8/25/24 1901)     ED Course as of 08/25/24 2221   Sun Aug 25, 2024   1814 NITRITE UA: Negative [IB]   1814 Leukocyte Esterase, UA: Negative [IB]   1814 RBC, UA: 0-5 [IB]   1814 WBC, UA: 0-5 [IB]   1814 Bacteria, UA: None Seen [IB]   1814 hCG Qualitative, Urine: Negative [IB]   1817 WBC: 10.78 [IB]   1817 Hemoglobin: 12.6 [IB]   1817 Platelet Count: 276 [IB]   1841 WBC: 10.78 [IB]   1841 Hemoglobin: 12.6 [IB]   1841 Platelet Count: 276 [IB]   2217 Reassuring hemogram; [CT]   2217 Reassuring chemistries; [CT]      ED Course User Index  [CT] Erasmo Becker MD  [IB] Clayton James, DO        Medical Decision Making  Amount and/or Complexity of Data Reviewed  Labs: ordered. Decision-making details documented in ED Course.  Radiology: ordered.    Risk  Risk Details: Received in sign-out at 7:00 p.m. today at which time lab data and CT remained pending.  The data have subsequently been resulted in found reassuring.  Patient is discharged as  anticipated in stable condition.  She will continue outpatient evaluation of the ongoing symptom.        CLINICAL IMPRESSION:  1. Abdominal pain, unspecified abdominal location        DISPOSITION:   ED Disposition Condition    Discharge Stable            ED Prescriptions    None       Follow-up Information       Follow up With Specialties Details Why Contact Info    [facility] - Emergency Dept Emergency Medicine  As needed, If symptoms worsen [address] [facility] [id number] [phone]               Theemiliano, Erasmo MAYS MD  08/25/24 4870

## 2024-08-25 NOTE — Clinical Note
"Chinyere Pastranamaria Osorio was seen and treated in our emergency department on 8/25/2024.  She may return to work on 08/27/2024.       If you have any questions or concerns, please don't hesitate to call.       RN    "

## 2025-04-08 ENCOUNTER — HOSPITAL ENCOUNTER (OUTPATIENT)
Facility: HOSPITAL | Age: 30
Discharge: HOME | DRG: 833 | End: 2025-04-09
Attending: OBSTETRICS & GYNECOLOGY | Admitting: OBSTETRICS & GYNECOLOGY
Payer: MEDICAID

## 2025-04-08 DIAGNOSIS — O21.0 HYPEREMESIS GRAVIDARUM: ICD-10-CM

## 2025-04-08 DIAGNOSIS — R11.10 HYPEREMESIS: ICD-10-CM

## 2025-04-08 DIAGNOSIS — O21.0 HYPEREMESIS GRAVIDARUM: Primary | ICD-10-CM

## 2025-04-08 LAB
ABORH RETYPE: NORMAL
ALBUMIN SERPL-MCNC: 2.9 G/DL (ref 3.5–5)
ALBUMIN/GLOB SERPL: 0.7 RATIO (ref 1.1–2)
ALP SERPL-CCNC: 46 UNIT/L (ref 40–150)
ALT SERPL-CCNC: 22 UNIT/L (ref 0–55)
ANION GAP SERPL CALC-SCNC: 10 MEQ/L
AST SERPL-CCNC: 17 UNIT/L (ref 11–45)
B-HCG FREE SERPL-ACNC: ABNORMAL MIU/ML
BACTERIA #/AREA URNS AUTO: ABNORMAL /HPF
BASOPHILS # BLD AUTO: 0.01 X10(3)/MCL
BASOPHILS NFR BLD AUTO: 0.2 %
BILIRUB DIRECT SERPL-MCNC: 0.1 MG/DL (ref 0–?)
BILIRUB SERPL-MCNC: 0.3 MG/DL
BILIRUB UR QL STRIP.AUTO: NEGATIVE
BUN SERPL-MCNC: 3.9 MG/DL (ref 7–18.7)
CALCIUM SERPL-MCNC: 9.1 MG/DL (ref 8.4–10.2)
CHLORIDE SERPL-SCNC: 105 MMOL/L (ref 98–107)
CLARITY UR: CLEAR
CO2 SERPL-SCNC: 21 MMOL/L (ref 22–29)
COLOR UR AUTO: ABNORMAL
CREAT SERPL-MCNC: 0.57 MG/DL (ref 0.55–1.02)
CREAT/UREA NIT SERPL: 7
EOSINOPHIL # BLD AUTO: 0.1 X10(3)/MCL (ref 0–0.9)
EOSINOPHIL NFR BLD AUTO: 1.8 %
ERYTHROCYTE [DISTWIDTH] IN BLOOD BY AUTOMATED COUNT: 14.3 % (ref 11.5–17)
GFR SERPLBLD CREATININE-BSD FMLA CKD-EPI: >60 ML/MIN/1.73/M2
GLOBULIN SER-MCNC: 4.1 GM/DL (ref 2.4–3.5)
GLUCOSE SERPL-MCNC: 118 MG/DL (ref 74–100)
GLUCOSE UR QL STRIP: NORMAL
GROUP & RH: NORMAL
HBV SURFACE AG SERPL QL IA: NONREACTIVE
HCT VFR BLD AUTO: 35.6 % (ref 37–47)
HGB BLD-MCNC: 11.4 G/DL (ref 12–16)
HGB UR QL STRIP: NEGATIVE
HIV 1+2 AB+HIV1 P24 AG SERPL QL IA: NONREACTIVE
IMM GRANULOCYTES # BLD AUTO: 0.02 X10(3)/MCL (ref 0–0.04)
IMM GRANULOCYTES NFR BLD AUTO: 0.4 %
INDIRECT COOMBS: NORMAL
KETONES UR QL STRIP: NEGATIVE
LEUKOCYTE ESTERASE UR QL STRIP: NEGATIVE
LYMPHOCYTES # BLD AUTO: 1.74 X10(3)/MCL (ref 0.6–4.6)
LYMPHOCYTES NFR BLD AUTO: 31.5 %
MCH RBC QN AUTO: 24.1 PG (ref 27–31)
MCHC RBC AUTO-ENTMCNC: 32 G/DL (ref 33–36)
MCV RBC AUTO: 75.1 FL (ref 80–94)
MONOCYTES # BLD AUTO: 0.27 X10(3)/MCL (ref 0.1–1.3)
MONOCYTES NFR BLD AUTO: 4.9 %
MUCOUS THREADS URNS QL MICRO: ABNORMAL /LPF
NEUTROPHILS # BLD AUTO: 3.39 X10(3)/MCL (ref 2.1–9.2)
NEUTROPHILS NFR BLD AUTO: 61.2 %
NITRITE UR QL STRIP: NEGATIVE
NRBC BLD AUTO-RTO: 0 %
PH UR STRIP: 6 [PH]
PLATELET # BLD AUTO: 180 X10(3)/MCL (ref 130–400)
PMV BLD AUTO: 11.2 FL (ref 7.4–10.4)
POTASSIUM SERPL-SCNC: 3.1 MMOL/L (ref 3.5–5.1)
PROT SERPL-MCNC: 7 GM/DL (ref 6.4–8.3)
PROT UR QL STRIP: NEGATIVE
RBC # BLD AUTO: 4.74 X10(6)/MCL (ref 4.2–5.4)
RBC #/AREA URNS AUTO: ABNORMAL /HPF
SODIUM SERPL-SCNC: 136 MMOL/L (ref 136–145)
SP GR UR STRIP.AUTO: 1.01 (ref 1–1.03)
SPECIMEN OUTDATE: NORMAL
SQUAMOUS #/AREA URNS LPF: ABNORMAL /HPF
T PALLIDUM AB SER QL: NONREACTIVE
UROBILINOGEN UR STRIP-ACNC: NORMAL
WBC # BLD AUTO: 5.53 X10(3)/MCL (ref 4.5–11.5)
WBC #/AREA URNS AUTO: ABNORMAL /HPF

## 2025-04-08 PROCEDURE — 87389 HIV-1 AG W/HIV-1&-2 AB AG IA: CPT | Performed by: OBSTETRICS & GYNECOLOGY

## 2025-04-08 PROCEDURE — 25000003 PHARM REV CODE 250: Performed by: NURSE PRACTITIONER

## 2025-04-08 PROCEDURE — 86780 TREPONEMA PALLIDUM: CPT | Performed by: OBSTETRICS & GYNECOLOGY

## 2025-04-08 PROCEDURE — 25000242 PHARM REV CODE 250 ALT 637 W/ HCPCS: Performed by: NURSE PRACTITIONER

## 2025-04-08 PROCEDURE — 82239 BILE ACIDS TOTAL: CPT | Performed by: OBSTETRICS & GYNECOLOGY

## 2025-04-08 PROCEDURE — G0378 HOSPITAL OBSERVATION PER HR: HCPCS

## 2025-04-08 PROCEDURE — 86900 BLOOD TYPING SEROLOGIC ABO: CPT | Performed by: OBSTETRICS & GYNECOLOGY

## 2025-04-08 PROCEDURE — 63600175 PHARM REV CODE 636 W HCPCS: Performed by: OBSTETRICS & GYNECOLOGY

## 2025-04-08 PROCEDURE — 25000003 PHARM REV CODE 250: Performed by: OBSTETRICS & GYNECOLOGY

## 2025-04-08 PROCEDURE — 82248 BILIRUBIN DIRECT: CPT | Performed by: OBSTETRICS & GYNECOLOGY

## 2025-04-08 PROCEDURE — G0379 DIRECT REFER HOSPITAL OBSERV: HCPCS

## 2025-04-08 PROCEDURE — 84702 CHORIONIC GONADOTROPIN TEST: CPT | Performed by: OBSTETRICS & GYNECOLOGY

## 2025-04-08 PROCEDURE — 87340 HEPATITIS B SURFACE AG IA: CPT | Performed by: OBSTETRICS & GYNECOLOGY

## 2025-04-08 PROCEDURE — 76815 OB US LIMITED FETUS(S): CPT | Mod: 26,,, | Performed by: OBSTETRICS & GYNECOLOGY

## 2025-04-08 PROCEDURE — 86803 HEPATITIS C AB TEST: CPT | Performed by: OBSTETRICS & GYNECOLOGY

## 2025-04-08 PROCEDURE — 85025 COMPLETE CBC W/AUTO DIFF WBC: CPT | Performed by: OBSTETRICS & GYNECOLOGY

## 2025-04-08 PROCEDURE — 80053 COMPREHEN METABOLIC PANEL: CPT | Performed by: OBSTETRICS & GYNECOLOGY

## 2025-04-08 PROCEDURE — 11000001 HC ACUTE MED/SURG PRIVATE ROOM

## 2025-04-08 PROCEDURE — 86762 RUBELLA ANTIBODY: CPT | Performed by: OBSTETRICS & GYNECOLOGY

## 2025-04-08 PROCEDURE — 36415 COLL VENOUS BLD VENIPUNCTURE: CPT | Performed by: OBSTETRICS & GYNECOLOGY

## 2025-04-08 PROCEDURE — 81001 URINALYSIS AUTO W/SCOPE: CPT | Performed by: OBSTETRICS & GYNECOLOGY

## 2025-04-08 RX ORDER — POTASSIUM CHLORIDE 20 MEQ/1
20 TABLET, EXTENDED RELEASE ORAL DAILY
Status: DISCONTINUED | OUTPATIENT
Start: 2025-04-09 | End: 2025-04-08

## 2025-04-08 RX ORDER — PROMETHAZINE HYDROCHLORIDE 12.5 MG/1
12.5 SUPPOSITORY RECTAL EVERY 4 HOURS PRN
Status: DISCONTINUED | OUTPATIENT
Start: 2025-04-08 | End: 2025-04-09 | Stop reason: HOSPADM

## 2025-04-08 RX ORDER — DEXTROSE, SODIUM CHLORIDE, SODIUM LACTATE, POTASSIUM CHLORIDE, AND CALCIUM CHLORIDE 5; .6; .31; .03; .02 G/100ML; G/100ML; G/100ML; G/100ML; G/100ML
INJECTION, SOLUTION INTRAVENOUS CONTINUOUS
Status: DISCONTINUED | OUTPATIENT
Start: 2025-04-08 | End: 2025-04-09 | Stop reason: HOSPADM

## 2025-04-08 RX ORDER — LANOLIN ALCOHOL/MO/W.PET/CERES
50 CREAM (GRAM) TOPICAL 2 TIMES DAILY
Status: DISCONTINUED | OUTPATIENT
Start: 2025-04-08 | End: 2025-04-09 | Stop reason: HOSPADM

## 2025-04-08 RX ORDER — POTASSIUM CHLORIDE 20 MEQ/1
20 TABLET, EXTENDED RELEASE ORAL 2 TIMES DAILY
Status: DISCONTINUED | OUTPATIENT
Start: 2025-04-09 | End: 2025-04-09 | Stop reason: HOSPADM

## 2025-04-08 RX ORDER — POTASSIUM CHLORIDE 20 MEQ/1
20 TABLET, EXTENDED RELEASE ORAL DAILY
Status: DISCONTINUED | OUTPATIENT
Start: 2025-04-08 | End: 2025-04-08

## 2025-04-08 RX ORDER — THIAMINE HYDROCHLORIDE 100 MG/ML
100 INJECTION, SOLUTION INTRAMUSCULAR; INTRAVENOUS DAILY
Status: COMPLETED | OUTPATIENT
Start: 2025-04-08 | End: 2025-04-09

## 2025-04-08 RX ADMIN — DOXYLAMINE SUCCINATE 25 MG: 25 TABLET ORAL at 08:04

## 2025-04-08 RX ADMIN — POTASSIUM CHLORIDE 20 MEQ: 1500 TABLET, EXTENDED RELEASE ORAL at 04:04

## 2025-04-08 RX ADMIN — DOXYLAMINE SUCCINATE 25 MG: 25 TABLET ORAL at 01:04

## 2025-04-08 RX ADMIN — PYRIDOXINE HCL TAB 50 MG 50 MG: 50 TAB at 01:04

## 2025-04-08 RX ADMIN — THIAMINE HYDROCHLORIDE 100 MG: 100 INJECTION, SOLUTION INTRAMUSCULAR; INTRAVENOUS at 01:04

## 2025-04-08 RX ADMIN — PYRIDOXINE HCL TAB 50 MG 50 MG: 50 TAB at 08:04

## 2025-04-08 NOTE — PROCEDURES
LIMITED OBSTETRICAL ULTRASOUND   REPORT    DATE OF ULTRASOUND: 04/08/2025     INDICATION:  Hyperemesis gravidarum     Limited ultrasound was done showed measurements at 12 weeks and 6 days gestation with a corresponding EDC of October 15, 2025.  There is viable pregnancy with a normal amniotic fluid volume.    There is a hypoechoic area posteriorly about 3 cm that probably represents a uterine contraction although can not rule out fibroid.  Follow-up assessment is necessary.      Impression:  Intrauterine pregnancy at 12 weeks and 6 days gestation by crown-rump length.  Final EDC to be determined based on certainty of LMP and correlation between the LMP and crown-rump length.        Jc Hidalgo MD       Components of this note were documented using voice recognition systems; and are subject to errors not corrected at proof reading. Please contact author for any clarifications.

## 2025-04-09 VITALS
RESPIRATION RATE: 16 BRPM | BODY MASS INDEX: 31.76 KG/M2 | TEMPERATURE: 98 F | OXYGEN SATURATION: 99 % | WEIGHT: 185 LBS | DIASTOLIC BLOOD PRESSURE: 80 MMHG | SYSTOLIC BLOOD PRESSURE: 116 MMHG | HEART RATE: 89 BPM

## 2025-04-09 DIAGNOSIS — O21.0 HYPEREMESIS GRAVIDARUM: Primary | ICD-10-CM

## 2025-04-09 LAB
ALBUMIN SERPL-MCNC: 2.6 G/DL (ref 3.5–5)
ALBUMIN/GLOB SERPL: 0.7 RATIO (ref 1.1–2)
ALP SERPL-CCNC: 49 UNIT/L (ref 40–150)
ALT SERPL-CCNC: 23 UNIT/L (ref 0–55)
ANION GAP SERPL CALC-SCNC: 9 MEQ/L
AST SERPL-CCNC: 17 UNIT/L (ref 11–45)
BILE AC SERPL-SCNC: 7 MCMOL/L
BILIRUB SERPL-MCNC: 0.3 MG/DL
BUN SERPL-MCNC: 3.6 MG/DL (ref 7–18.7)
CALCIUM SERPL-MCNC: 8.2 MG/DL (ref 8.4–10.2)
CHLORIDE SERPL-SCNC: 107 MMOL/L (ref 98–107)
CO2 SERPL-SCNC: 20 MMOL/L (ref 22–29)
CREAT SERPL-MCNC: 0.56 MG/DL (ref 0.55–1.02)
CREAT/UREA NIT SERPL: 6
ERYTHROCYTE [DISTWIDTH] IN BLOOD BY AUTOMATED COUNT: 14.6 % (ref 11.5–17)
GFR SERPLBLD CREATININE-BSD FMLA CKD-EPI: >60 ML/MIN/1.73/M2
GLOBULIN SER-MCNC: 3.6 GM/DL (ref 2.4–3.5)
GLUCOSE SERPL-MCNC: 105 MG/DL (ref 74–100)
HCT VFR BLD AUTO: 32.6 % (ref 37–47)
HCV AB SERPL QL IA: NONREACTIVE
HGB BLD-MCNC: 10.6 G/DL (ref 12–16)
MCH RBC QN AUTO: 24.3 PG (ref 27–31)
MCHC RBC AUTO-ENTMCNC: 32.5 G/DL (ref 33–36)
MCV RBC AUTO: 74.6 FL (ref 80–94)
NRBC BLD AUTO-RTO: 0 %
PLATELET # BLD AUTO: 152 X10(3)/MCL (ref 130–400)
PMV BLD AUTO: 11.6 FL (ref 7.4–10.4)
POTASSIUM SERPL-SCNC: 3.3 MMOL/L (ref 3.5–5.1)
PROT SERPL-MCNC: 6.2 GM/DL (ref 6.4–8.3)
RBC # BLD AUTO: 4.37 X10(6)/MCL (ref 4.2–5.4)
RUBV IGG SERPL IA-ACNC: 1.5
RUBV IGG SERPL QL IA: POSITIVE
SODIUM SERPL-SCNC: 136 MMOL/L (ref 136–145)
WBC # BLD AUTO: 5.16 X10(3)/MCL (ref 4.5–11.5)

## 2025-04-09 PROCEDURE — 85027 COMPLETE CBC AUTOMATED: CPT | Performed by: NURSE PRACTITIONER

## 2025-04-09 PROCEDURE — 63600175 PHARM REV CODE 636 W HCPCS: Performed by: OBSTETRICS & GYNECOLOGY

## 2025-04-09 PROCEDURE — 80053 COMPREHEN METABOLIC PANEL: CPT | Performed by: NURSE PRACTITIONER

## 2025-04-09 PROCEDURE — 25000242 PHARM REV CODE 250 ALT 637 W/ HCPCS: Performed by: NURSE PRACTITIONER

## 2025-04-09 PROCEDURE — 25000003 PHARM REV CODE 250: Performed by: NURSE PRACTITIONER

## 2025-04-09 PROCEDURE — 99233 SBSQ HOSP IP/OBS HIGH 50: CPT | Mod: ,,, | Performed by: NURSE PRACTITIONER

## 2025-04-09 PROCEDURE — G0378 HOSPITAL OBSERVATION PER HR: HCPCS

## 2025-04-09 PROCEDURE — 36415 COLL VENOUS BLD VENIPUNCTURE: CPT | Performed by: NURSE PRACTITIONER

## 2025-04-09 RX ORDER — PROMETHAZINE HYDROCHLORIDE 25 MG/1
25 TABLET ORAL EVERY 6 HOURS PRN
Qty: 30 TABLET | Refills: 3 | Status: SHIPPED | OUTPATIENT
Start: 2025-04-09

## 2025-04-09 RX ORDER — POTASSIUM CHLORIDE 20 MEQ/1
20 TABLET, EXTENDED RELEASE ORAL 2 TIMES DAILY
Qty: 6 TABLET | Refills: 0 | Status: SHIPPED | OUTPATIENT
Start: 2025-04-09 | End: 2025-04-12

## 2025-04-09 RX ORDER — PYRIDOXINE HCL (VITAMIN B6) 25 MG
25 TABLET ORAL 3 TIMES DAILY
Qty: 90 TABLET | Refills: 3 | Status: SHIPPED | OUTPATIENT
Start: 2025-04-09

## 2025-04-09 RX ADMIN — PYRIDOXINE HCL TAB 50 MG 50 MG: 50 TAB at 09:04

## 2025-04-09 RX ADMIN — THIAMINE HYDROCHLORIDE 100 MG: 100 INJECTION, SOLUTION INTRAMUSCULAR; INTRAVENOUS at 09:04

## 2025-04-09 RX ADMIN — POTASSIUM CHLORIDE 20 MEQ: 1500 TABLET, EXTENDED RELEASE ORAL at 09:04

## 2025-04-09 RX ADMIN — DOXYLAMINE SUCCINATE 25 MG: 25 TABLET ORAL at 09:04

## 2025-04-09 RX ADMIN — SODIUM CHLORIDE, SODIUM LACTATE, POTASSIUM CHLORIDE, CALCIUM CHLORIDE AND DEXTROSE MONOHYDRATE: 5; 600; 310; 30; 20 INJECTION, SOLUTION INTRAVENOUS at 03:04

## 2025-04-09 NOTE — CONSULTS
Maternal Fetal Medicine Consult Note    Chinyere Osorio is a 29 y.o.  female  at 13 weeks 1 day gestation by early US not consistent with LMP who was admitted for nausea/vomiting. She reports that she has been having some intermittent nausea and vomiting of the last couple of weeks that waxes and wanes.  She had is a 1st OB appointment yesterday and was sent to the hospital for hydration and further management.  She has been on vitamin B6, doxylamine, thiamine and promethazine regimen. With low potassium, she also started on potassium supplementation.  She reports nausea/vomiting improved and has been tolerating regular diet last night and this morning.  Aside from this issue, patient does report history of uterine fibroids.  She had previous  delivery for nonreassuring fetal testing around 36 weeks in her 2018 pregnancy.  She denies leaking of fluid, vaginal bleeding, cramping.       Review of Systems   12 point review of systems conducted, negative except as stated in the history of present illness. See HPI for details.    Past Medical History:   Diagnosis Date    Anxiety     Headache         Past Surgical History:   Procedure Laterality Date     SECTION                Family History   Problem Relation Name Age of Onset    Cancer Maternal Aunt      Cancer Paternal Grandmother          Temp:  [98 °F (36.7 °C)-98.5 °F (36.9 °C)] 98 °F (36.7 °C)  Pulse:  [89-98] 89  Resp:  [16-18] 16  SpO2:  [99 %-100 %] 99 %  BP: (111-116)/(71-80) 116/80    Physical Exam  Vitals and nursing note reviewed.   Constitutional:       Appearance: Normal appearance.   HENT:      Head: Normocephalic and atraumatic.   Cardiovascular:      Rate and Rhythm: Normal rate and regular rhythm.   Pulmonary:      Effort: Pulmonary effort is normal. No respiratory distress.      Breath sounds: Normal breath sounds.   Abdominal:      Palpations: Abdomen is soft.      Tenderness: There is no abdominal tenderness.    Musculoskeletal:      Right lower leg: No edema.      Left lower leg: No edema.   Skin:     General: Skin is warm and dry.   Neurological:      Mental Status: She is alert and oriented to person, place, and time.   Psychiatric:         Mood and Affect: Mood normal.         Behavior: Behavior normal.         Thought Content: Thought content normal.         Judgment: Judgment normal.          Recent Results (from the past 48 hours)   Urinalysis    Collection Time: 04/08/25 11:52 AM   Result Value Ref Range    Color, UA Light-Yellow Yellow, Light-Yellow, Colorless, Straw, Dark-Yellow    Appearance, UA Clear Clear    Specific Gravity, UA 1.010 1.005 - 1.030    pH, UA 6.0 5.0 - 8.5    Protein, UA Negative Negative    Glucose, UA Normal Negative, Normal    Ketones, UA Negative Negative    Blood, UA Negative Negative    Bilirubin, UA Negative Negative    Urobilinogen, UA Normal 0.2, 1.0, Normal    Nitrites, UA Negative Negative    Leukocyte Esterase, UA Negative Negative    RBC, UA 0-5 None Seen, 0-2, 3-5, 0-5 /HPF    WBC, UA 0-5 None Seen, 0-2, 3-5, 0-5 /HPF    Bacteria, UA Occasional (A) None Seen, Trace /HPF    Squamous Epithelial Cells, UA Trace None Seen, Trace /HPF    Mucous, UA Trace (A) None Seen /LPF   Type & Screen    Collection Time: 04/08/25 12:34 PM   Result Value Ref Range    Group & Rh O POS     Indirect Peggy GEL NEG     Specimen Outdate 04/11/2025 23:59    Bilirubin, Direct    Collection Time: 04/08/25 12:34 PM   Result Value Ref Range    Bilirubin Direct 0.1 0.0 - <0.5 mg/dL   Comprehensive Metabolic Panel    Collection Time: 04/08/25 12:34 PM   Result Value Ref Range    Sodium 136 136 - 145 mmol/L    Potassium 3.1 (L) 3.5 - 5.1 mmol/L    Chloride 105 98 - 107 mmol/L    CO2 21 (L) 22 - 29 mmol/L    Glucose 118 (H) 74 - 100 mg/dL    Blood Urea Nitrogen 3.9 (L) 7.0 - 18.7 mg/dL    Creatinine 0.57 0.55 - 1.02 mg/dL    Calcium 9.1 8.4 - 10.2 mg/dL    Protein Total 7.0 6.4 - 8.3 gm/dL    Albumin 2.9 (L) 3.5  - 5.0 g/dL    Globulin 4.1 (H) 2.4 - 3.5 gm/dL    Albumin/Globulin Ratio 0.7 (L) 1.1 - 2.0 ratio    Bilirubin Total 0.3 <=1.5 mg/dL    ALP 46 40 - 150 unit/L    ALT 22 0 - 55 unit/L    AST 17 11 - 45 unit/L    eGFR >60 mL/min/1.73/m2    Anion Gap 10.0 mEq/L    BUN/Creatinine Ratio 7    HCG, Quantitative    Collection Time: 04/08/25 12:34 PM   Result Value Ref Range    Beta HCG Quant 168,980.09 (H) <=5.00 mIU/mL   Hepatitis B Surface Antigen    Collection Time: 04/08/25 12:34 PM   Result Value Ref Range    Hep BsAg Interp Nonreactive Nonreactive   Hepatitis C Antibody    Collection Time: 04/08/25 12:34 PM   Result Value Ref Range    Hep C Ab Interp Nonreactive Nonreactive   HIV 1/2 Ag/Ab (4th Gen)    Collection Time: 04/08/25 12:34 PM   Result Value Ref Range    HIV Nonreactive Nonreactive   CBC with Differential    Collection Time: 04/08/25 12:34 PM   Result Value Ref Range    WBC 5.53 4.50 - 11.50 x10(3)/mcL    RBC 4.74 4.20 - 5.40 x10(6)/mcL    Hgb 11.4 (L) 12.0 - 16.0 g/dL    Hct 35.6 (L) 37.0 - 47.0 %    MCV 75.1 (L) 80.0 - 94.0 fL    MCH 24.1 (L) 27.0 - 31.0 pg    MCHC 32.0 (L) 33.0 - 36.0 g/dL    RDW 14.3 11.5 - 17.0 %    Platelet 180 130 - 400 x10(3)/mcL    MPV 11.2 (H) 7.4 - 10.4 fL    Neut % 61.2 %    Lymph % 31.5 %    Mono % 4.9 %    Eos % 1.8 %    Basophil % 0.2 %    Imm Grans % 0.4 %    Neut # 3.39 2.1 - 9.2 x10(3)/mcL    Lymph # 1.74 0.6 - 4.6 x10(3)/mcL    Mono # 0.27 0.1 - 1.3 x10(3)/mcL    Eos # 0.10 0 - 0.9 x10(3)/mcL    Baso # 0.01 <=0.2 x10(3)/mcL    Imm Gran # 0.02 0.00 - 0.04 x10(3)/mcL    NRBC% 0.0 %   SYPHILIS ANTIBODY (WITH REFLEX RPR)    Collection Time: 04/08/25 12:34 PM   Result Value Ref Range    Syphilis Antibody Nonreactive Nonreactive, Equivocal   ABORH RETYPE    Collection Time: 04/08/25  2:12 PM   Result Value Ref Range    ABORH Retype O POS    CBC Without Differential    Collection Time: 04/09/25  4:45 AM   Result Value Ref Range    WBC 5.16 4.50 - 11.50 x10(3)/mcL    RBC 4.37 4.20  - 5.40 x10(6)/mcL    Hgb 10.6 (L) 12.0 - 16.0 g/dL    Hct 32.6 (L) 37.0 - 47.0 %    MCV 74.6 (L) 80.0 - 94.0 fL    MCH 24.3 (L) 27.0 - 31.0 pg    MCHC 32.5 (L) 33.0 - 36.0 g/dL    RDW 14.6 11.5 - 17.0 %    Platelet 152 130 - 400 x10(3)/mcL    MPV 11.6 (H) 7.4 - 10.4 fL    NRBC% 0.0 %   Comprehensive Metabolic Panel    Collection Time: 04/09/25  4:45 AM   Result Value Ref Range    Sodium 136 136 - 145 mmol/L    Potassium 3.3 (L) 3.5 - 5.1 mmol/L    Chloride 107 98 - 107 mmol/L    CO2 20 (L) 22 - 29 mmol/L    Glucose 105 (H) 74 - 100 mg/dL    Blood Urea Nitrogen 3.6 (L) 7.0 - 18.7 mg/dL    Creatinine 0.56 0.55 - 1.02 mg/dL    Calcium 8.2 (L) 8.4 - 10.2 mg/dL    Protein Total 6.2 (L) 6.4 - 8.3 gm/dL    Albumin 2.6 (L) 3.5 - 5.0 g/dL    Globulin 3.6 (H) 2.4 - 3.5 gm/dL    Albumin/Globulin Ratio 0.7 (L) 1.1 - 2.0 ratio    Bilirubin Total 0.3 <=1.5 mg/dL    ALP 49 40 - 150 unit/L    ALT 23 0 - 55 unit/L    AST 17 11 - 45 unit/L    eGFR >60 mL/min/1.73/m2    Anion Gap 9.0 mEq/L    BUN/Creatinine Ratio 6         US OB Non Rad <14 Wks, TransAbd, Single Gestation  See Provider Notes for results.     IMPRESSION: Please see provider notes for interpretation    This procedure was auto-finalized by: Virtual Radiologist      Assessment/Plan    13 1/7 weeks gestation with     N/V of pregnancy  Discussed with her that nausea and vomiting affects about 50% of pregnancies, and additional 25% have nausea only. The severe form of the condition, hyperemesis gravidarum with significant weight loss, electrolyte abnormalities affect only about 0.3-1% of pregnancies. She does not appear to be dehydrated and with stable labs, tolerating regular diet, appears stable for discharge to home at this time.  I advised her to take vitamin B6 25 mg three times a day, in addition to Doxylamine 25mg at bed time and 12.5 mg in am and 12.5 mg in afternoon. Phenergan 25 mg every six hours PO can be used as needed. She was advised on a low-fat bland diet,  and eat frequent small meals, avoid juices, and avoid fluids with the meals.  If symptoms worsen, patient was advised to return to the emergency room or labor and delivery.      Mild hypokalemia  Continue potassium 20 mEq PO twice daily.  Sent prescription for her to continue this for 3 days.  Faxed lab order to [facility] in [place] for repeat potassium level, and patient was advised to do the blood work on Friday this week.      Mild anemia  I suspect that she will not tolerate p.o. iron at this time.  With only mild anemia, we will hold off on that treatment until nausea/vomiting improved and consider to start iron supplementation 325 mg as well as vitamin-C 250 mg and folic acid 1 mg every other day in the next week or so.      History of uterine fibroids  Uterine fibroid suspected on admit ultrasound but suboptimal images.  We will follow up on this at her upcoming followup office visit and discuss further at that time.         TAWANA Joe  Discussed above with Dr. Hidalgo who provided recommendations and agreed with plan of care as above.      This note was created with the assistance of [employer] voice recognition software. There may be transcription errors as a result of using this technology, however minimal. Effort has been made to assure accuracy of transcription, but any obvious errors or omissions should be clarified with the author of the document.

## 2025-04-09 NOTE — PLAN OF CARE
Problem: Adult Inpatient Plan of Care  Goal: Plan of Care Review  4/9/2025 0225 by Madeline Hardin LPN  Outcome: Progressing  4/9/2025 0223 by Madeline Hardin LPN  Reactivated  Goal: Patient-Specific Goal (Individualized)  4/9/2025 0225 by Madeline Hardin LPN  Outcome: Progressing  4/9/2025 0223 by Madeline Hardin LPN  Reactivated  Goal: Absence of Hospital-Acquired Illness or Injury  4/9/2025 0225 by Madeline Hardin LPN  Outcome: Progressing  4/9/2025 0223 by Madeline Hardin LPN  Reactivated  Goal: Optimal Comfort and Wellbeing  4/9/2025 0225 by Madeline Hardin LPN  Outcome: Progressing  4/9/2025 0223 by Madeline Hardin LPN  Reactivated  Goal: Readiness for Transition of Care  4/9/2025 0225 by Madeline Hardin LPN  Outcome: Progressing  4/9/2025 0223 by Madeline Hardin LPN  Reactivjose luis     Problem: Hyperemesis Gravidarum  Goal: Nausea and Vomiting Relief  4/9/2025 0225 by Madeline Hardin LPN  Outcome: Progressing  4/9/2025 0223 by Madeline Hardin LPN Reactivated

## 2025-04-09 NOTE — NURSING
Written discharge instructions were given and reviewed regarding prescribed meds, follow up appointments, and precautions to take at home. Also reviewed written and verbal instructions related to reasons to call MD/when to come back to the ER. Pt verbalized understanding.

## 2025-04-09 NOTE — DISCHARGE SUMMARY
[facility]                       [address] [address]    PATIENT NAME:       KIARRA OSORIO   YOB: 1995  CSN:                [id number]   MRN:                [id number]  ADMIT DATE:         04/08/2025 10:38:00  PHYSICIAN:          Morro Mendosa MD                          DISCHARGE SUMMARY    DATE OF DISCHARGE:  04/09/2025 14:12:00    HOSPITAL COURSE:  Ms. Osorio is a patient who is a 29-year-old female, who   presented at 6 to 7 weeks of intrauterine pregnancy with hyperemesis gravidarum.    She was admitted and given intravenous rehydration and antiemetic therapy.    She was seen by the Maternal Fetal Medicine physician Dr. Hidalgo' team.  She will   be discharged to home on Unisom, vitamin B6, antiemetic therapy, and follow up   in office next week.  She has had resolution of her nausea and vomiting.  She   has no labs that are demonstratively bad.        ______________________________  Morro Mendosa MD    DCR/AQS  DD:  04/09/2025  Time:  01:27PM  DT:  04/09/2025  Time:  04:50PM  Job #:  [id number]    cc:   __________, [phone]        __________, [phone]        DISCHARGE SUMMARY

## 2025-04-10 ENCOUNTER — TELEPHONE (OUTPATIENT)
Dept: MATERNAL FETAL MEDICINE | Facility: CLINIC | Age: 30
End: 2025-04-10
Payer: MEDICAID

## 2025-04-10 DIAGNOSIS — O36.80X0 ENCOUNTER TO DETERMINE FETAL VIABILITY OF PREGNANCY: ICD-10-CM

## 2025-04-10 DIAGNOSIS — O21.0 HYPEREMESIS GRAVIDARUM: Primary | ICD-10-CM

## 2025-04-23 ENCOUNTER — DOCUMENTATION ONLY (OUTPATIENT)
Dept: MATERNAL FETAL MEDICINE | Facility: CLINIC | Age: 30
End: 2025-04-23
Payer: MEDICAID

## 2025-04-23 PROBLEM — O99.212 OBESITY AFFECTING PREGNANCY IN SECOND TRIMESTER: Status: ACTIVE | Noted: 2025-04-23

## 2025-04-23 PROBLEM — O34.10 LEIOMYOMA OF UTERUS AFFECTING PREGNANCY: Status: ACTIVE | Noted: 2025-04-23

## 2025-04-23 PROBLEM — O99.012 ANEMIA DURING PREGNANCY IN SECOND TRIMESTER: Status: ACTIVE | Noted: 2025-04-23

## 2025-04-23 PROBLEM — D25.9 LEIOMYOMA OF UTERUS AFFECTING PREGNANCY: Status: ACTIVE | Noted: 2025-04-23

## 2025-04-23 NOTE — PROGRESS NOTES
Maternal Fetal Medicine New Consult    Subjective:     Patient ID: [id number]    Chief Complaint: mfm followup w/us      HPI: 29 y.o.  female  at 15w2d gestation with Estimated Date of Delivery: 10/14/25  by previously assigned EDC. She is sent for MFM consultation for hospital follow up for hyperemesis gravidarum.     She was seen in the hospital on 2025 for hyperemesis gravidarum.  She states she is taking doxylamine and Phenergan but is not taking vitamin B6.  She is feeling better.  She had mild hypokalemia, and was discharged home on potassium to finish and repeat potassium level on 04/15.25 that was normal at 3.9. .  She has history of uterine fibroids.  She had previous  delivery for nonreassuring fetal tracing around 36 weeks.  She had mild anemia with H/H 10.6/32.6 on 2025 and often to wait to start treatment after nausea/vomiting had resolved.  She had elevated BMI greater than 30 at earlier OB visit.  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       She denies any personal or family history of aneuploidy or anomalies. She denies any exposure to high fevers, viral rashes, illicit drugs or alcohol in this pregnancy.  She denies any leaking fluid, vaginal bleeding, contractions, decreased fetal movement. Denies headaches, visual disturbances, or epigastric pain.       Pregnancy complications include:  Problem List[1]    Past Medical History:   Diagnosis Date    Anxiety     Anxiety disorder 2019    Headache     Herpes simplex virus (HSV) infection     HSV 1/ Pt has never had a breakout       Past Surgical History:   Procedure Laterality Date     SECTION  2018    DILATION AND CURETTAGE OF UTERUS      Cyst Rupture    WISDOM TOOTH EXTRACTION      Two bottom teeth were removed       Family History   Problem Relation Name Age of Onset    Stomach cancer Paternal Grandmother      Eclampsia Mother      Hypertension Mother      Ovarian cysts Mother    "   Fibroids Mother      Hypertension Sister      Fibroids Sister      Ovarian cysts Sister      Cancer Maternal Aunt         Social History     Socioeconomic History    Marital status: Single   Tobacco Use    Smoking status: Never     Passive exposure: Never    Smokeless tobacco: Never   Substance and Sexual Activity    Alcohol use: Never     Comment: social    Drug use: Never    Sexual activity: Not Currently     Partners: Male     Social Drivers of Health     Financial Resource Strain: Low Risk  (4/8/2025)    Overall Financial Resource Strain (CARDIA)     Difficulty of Paying Living Expenses: Not hard at all   Food Insecurity: No Food Insecurity (4/8/2025)    Hunger Vital Sign     Worried About Running Out of Food in the Last Year: Never true     Ran Out of Food in the Last Year: Never true   Transportation Needs: No Transportation Needs (4/8/2025)    PRAPARE - Transportation     Lack of Transportation (Medical): No     Lack of Transportation (Non-Medical): No   Physical Activity: Inactive (4/8/2025)    Exercise Vital Sign     Days of Exercise per Week: 0 days     Minutes of Exercise per Session: 0 min   Stress: No Stress Concern Present (4/8/2025)    Citizen of [place] [employer] - Occupational Stress Questionnaire     Feeling of Stress : Only a little   Housing Stability: Low Risk  (4/8/2025)    Housing Stability Vital Sign     Unable to Pay for Housing in the Last Year: No     Number of Times Moved in the Last Year: 0     Homeless in the Last Year: No       Current Medications[2]    Review of patient's allergies indicates:   Allergen Reactions    Ondansetron hcl Hives and Other (See Comments)        Review of Systems   12 point review of systems conducted, negative except as stated in the history of present illness. See HPI for details.      Objective:     Visit Vitals  /86 (BP Location: Left arm, Patient Position: Sitting)   Pulse 103   Ht 5' 4" (1.626 m)   Wt 83.7 kg (184 lb 9.6 oz)   LMP " 2024 (Exact Date)   BMI 31.69 kg/m²        Physical Exam  Vitals and nursing note reviewed.   Constitutional:       General: She is not in acute distress.     Appearance: Normal appearance.      Comments: Increased BMI   HENT:      Head: Normocephalic and atraumatic.      Nose: Nose normal. No congestion.      Mouth/Throat:      Pharynx: Oropharynx is clear.   Eyes:      General: No scleral icterus.     Conjunctiva/sclera: Conjunctivae normal.   Cardiovascular:      Rate and Rhythm: Normal rate and regular rhythm.   Pulmonary:      Effort: No respiratory distress.      Breath sounds: Normal breath sounds. No wheezing.   Abdominal:      General: Abdomen is flat.      Palpations: Abdomen is soft.      Tenderness: There is no abdominal tenderness. There is no right CVA tenderness, left CVA tenderness or guarding.      Comments: No CVA tenderness gravid uterus.    Musculoskeletal:         General: Normal range of motion.      Cervical back: Neck supple.      Right lower leg: No edema.      Left lower leg: No edema.   Skin:     General: Skin is warm.      Findings: No bruising or rash.   Neurological:      General: No focal deficit present.      Mental Status: She is oriented to person, place, and time.      Deep Tendon Reflexes: Reflexes normal.      Comments: Normal reflexes   Psychiatric:         Mood and Affect: Mood normal.         Behavior: Behavior normal.         Thought Content: Thought content normal.         Judgment: Judgment normal.         Assessment/Plan:     29 y.o.  female with IUP at 15w2d    Hyperemesis gravidarum, improved.  Viable IUP with size consistent with established dating on 25    Relatively stable weight since hospital visit.  Advised the patient to restart vitamin B6 25 mg three times a day, in addition to Doxylamine 25mg at bed time and 12.5 mg in am and 12.5 mg in afternoon. Phenergan 25 mg every six hours prn orally. She was advised on a low-fat bland diet, and eat  frequent small meals, avoid juices, and avoid fluids with the meals.  If symptoms worsen, patient was advised to go to the emergency room or labor and delivery.        Leiomyoma  I counseled the patient regarding fibroids in pregnancy. She was counseled on the risk for  delivery,  PROM, abnormal presentations, higher risk of  sections, placenta previa, fibroid degeneration, poor fetal growth, oligohydramnios, vaginal bleeding and postpartum hemorrhage and in rare circumstances obstruction of lower uterus/cervix, impeding vaginal delivery. Although it was previously suggested that myomas are associated with spontaneous miscarriages, a recent review from 2017, showed no increased risk of spontaneous miscarriages in an analysis of 20,000 pregnant women with fibroids.Recent data shows potential association with increase stillbirth risk. The size of myomas, number and position behind placenta may be important factors in that risk. There is no prevention available and expectant management needs to be done.     Myomectomy is generally avoided in pregnancy due to concerns regarding pregnancy complications.    Recommend fetal growth ultrasound at 32 weeks (if no other risk factors) for patients with a single large fibroid or multiple fibroids. If concern for degenerating fibroids, can administer short course of NSAID (48-72hrs of Indocin) prior to 32 weeks gestation.     She was instructed to report any increased pain, cramps, vaginal discharge or bleeding. Emphasized the importance of monitoring fetal kick count activity, and reporting immediately if decreased fetal movement occurs.    Document postpartum hemorrhage risk on admission to hospital, ensure T&S sent, and consider type and crossmatch depending on postpartum hemorrhage risk      Previous  delivery for nonreassuring fetal testing  We will plan to check fetal growth intermittently in the 3rd trimester, around 32 and 36 weeks.  Consider  to start weekly fetal testing around 34 weeks.  Fetal kick count instructions given.          Anemia  The World Health Organization (WHO) defines anemia as a hemoglobin level <11 g/dL (approximately equivalent to a hematocrit <33 percent) in the first trimester, <10.5 g/dL in the second trimester, <10.5 to 11 g/dL in the third trimester, or <10 g/dL postpartum. Anemia affects approximately 30 percent of reproductive-age females and 40 percent of pregnant individuals, mostly due to iron deficiency.       Physiologic anemia of pregnancy and iron deficiency are the two most common causes of anemia in pregnancy. Much less common causes of anemia include hemoglobinopathies (sickle cell, thalassemia), RBC membrane disorders (hereditary spherocytosis and had hereditary Elliptocytosis), and acquired anemias (folate deficiency, vitamin B12 deficiency, other vitamin deficiencies, autoimmune hemolytic anemia Anemia, hypothyroidism and chronic kidney disease). All gravidas with anemia or symptoms of anemia should have prompt testing for iron deficiency because iron deficiency can progress to anemia; iron deficiency is the most common pathologic cause of anemia in pregnancy.    I discussed with her that iron deficiency during the first two trimesters of pregnancy is associated with a 2-fold increased risk for  delivery and a 3-fold increased risk for delivering a low-birth-weight baby.    We will start the patient oral hematinic therapy with ferrous sulfate 325 mg every other day, vitamin-C 250 mg every other day, and folic acid 1 mg every other day.  Recommend intermittent CBC throughout pregnancy to assess response to therapy.      HSV antibody  She reports had positive HSV 1 antibody with primary OB with no history of outbreak. While there are no clear guidelines in cases of +antibody without history of outbreaks, it would be reasonable to use prophylactic Valtrex to 500mg bid around 35-36 weeks and continue until  delivery.        BMI greater than 30  Body mass index is 31.69 kg/m².    I discussed the risk of miscarriage in first trimester, recurrent miscarriages, congenital anomalies, hypertension, diabetes,  labor and the higher risk of  section and the higher risk of fetal demise in-utero. There is also higher risk of for excessive fetal weight and large for gestational age (LGA) fetuses. Mothers with LGA fetuses are at higher risk of prolonged labor,  delivery, shoulder dystocia and birth trauma. LGA neonates are increased risk of fetal hypoxia and intrauterine death, and are at risk to develop diabetes, obesity, metabolic syndrome, asthma and cancer later in life. She was advised of the importance of eating healthy and limiting weight gain to 11-20 lbs during the pregnancy, as optimal in this situation. I recommended low calorie, low fat diet avoiding any additional excessive weight gain. Excess weight gain would be associated with gestational hypertension, gestational diabetes and adverse  outcomes, including fetal demise in utero.    We recommend 1 hour GCT between 24-28 weeks' gestation.     Importance of working on losing weight after the pregnancy is over, especially before a future pregnancy was discussed. Breastfeeding may be an important tool in reducing the postpartum weight retention. Fetal risks were discussed with short term risk of fetal/ obesity and long term risk of adolescent component of metabolic syndrome.        Preeclampsia prophylaxis  With her risk factors for preeclampsia including BMI over 30,  ancestry, and low socioeconomic status, discussed recommendations for low dose aspirin use to decrease risks for adverse outcomes, including preeclampsia, low birth weight and  delivery. Low-dose aspirin reduced the risk for preeclampsia by 15% in clinical trials and reduced the risk for  birth by 20% and FGR by 20%, and  mortality by  around 20%.  After discussing risks/benefits of its use, it was agreed to start asa 81 mg BID, due to multiple risk factors for preeclampsia. After discussing benefits (more effective than daily) vs potential risks (less data on safety with use at delivery), she agreed to start low dose aspirin twice daily and continue until 34 6/7weeks, then decrease to once daily until delivery.  Preeclampsia precautions given.    Patient is hyperemesis is improving.  We will continue doxylamine and Phenergan and added vitamin B6.  Patient will take aspirin b.i.d. after counseling.  Importance of healthy diet and proper weight gain recommendations reviewed.  Counseled the patient of the fibroid and expectant management.      Follow up in about 5 weeks (around 5/29/2025) for MFM Followup, Level 2 Ultrasound.     No future appointments.       Patient was evaluated and examined by Dr. Hidalgo. TAWANA Arnold, helped in pre charting of part of note.    This note was created with the assistance of [employer] voice recognition software. There may be transcription errors as a result of using this technology, however minimal. Effort has been made to ensure accuracy of transcription, but any obvious errors or omissions should be clarified with the author of the document.           [1]   Patient Active Problem List  Diagnosis    Hyperemesis gravidarum    Leiomyoma of uterus affecting pregnancy    Anemia during pregnancy in second trimester    Increased BMI affecting pregnancy in second trimester    Herpes virus infection in mother during second trimester of pregnancy   [2]   Current Outpatient Medications   Medication Sig Dispense Refill    doxylamine succinate (UNISOM, DOXYLAMINE,) 25 mg tablet Take 1/2 tablet (12.5 mg) in the morning, 1/2 tablet (12.5 mg) in the afternoon, and 1 tablet (25mg) at bedtime. 60 tablet 3    prenatal vit/iron fum/folic ac (PRENATAL 1+1 ORAL) Take by mouth.      promethazine (PHENERGAN) 25 MG tablet Take 1  tablet (25 mg total) by mouth every 6 (six) hours as needed for Nausea. 30 tablet 3    ferrous sulfate (FEOSOL) 325 mg (65 mg iron) Tab tablet Take 1 tablet (325 mg total) by mouth every other day. 15 tablet 3    folic acid (FOLVITE) 1 MG tablet Take 1 tablet (1 mg total) by mouth every other day. 15 tablet 3    pyridoxine, vitamin B6, (B-6) 25 MG Tab Take 1 tablet (25 mg total) by mouth 3 (three) times daily. (Patient not taking: Reported on 4/24/2025) 90 tablet 3     No current facility-administered medications for this visit.

## 2025-04-24 ENCOUNTER — DOCUMENTATION ONLY (OUTPATIENT)
Dept: MATERNAL FETAL MEDICINE | Facility: CLINIC | Age: 30
End: 2025-04-24

## 2025-04-24 ENCOUNTER — PROCEDURE VISIT (OUTPATIENT)
Dept: MATERNAL FETAL MEDICINE | Facility: CLINIC | Age: 30
End: 2025-04-24
Payer: MEDICAID

## 2025-04-24 ENCOUNTER — OFFICE VISIT (OUTPATIENT)
Dept: MATERNAL FETAL MEDICINE | Facility: CLINIC | Age: 30
End: 2025-04-24
Payer: MEDICAID

## 2025-04-24 VITALS
SYSTOLIC BLOOD PRESSURE: 136 MMHG | HEART RATE: 103 BPM | HEIGHT: 64 IN | WEIGHT: 184.63 LBS | BODY MASS INDEX: 31.52 KG/M2 | DIASTOLIC BLOOD PRESSURE: 86 MMHG

## 2025-04-24 DIAGNOSIS — D25.9 LEIOMYOMA OF UTERUS AFFECTING PREGNANCY IN SECOND TRIMESTER: ICD-10-CM

## 2025-04-24 DIAGNOSIS — O21.0 HYPEREMESIS GRAVIDARUM: ICD-10-CM

## 2025-04-24 DIAGNOSIS — O36.80X0 ENCOUNTER TO DETERMINE FETAL VIABILITY OF PREGNANCY: ICD-10-CM

## 2025-04-24 DIAGNOSIS — O21.0 HYPEREMESIS GRAVIDARUM: Primary | ICD-10-CM

## 2025-04-24 DIAGNOSIS — O34.12 LEIOMYOMA OF UTERUS AFFECTING PREGNANCY IN SECOND TRIMESTER: ICD-10-CM

## 2025-04-24 DIAGNOSIS — O99.012 ANEMIA DURING PREGNANCY IN SECOND TRIMESTER: ICD-10-CM

## 2025-04-24 DIAGNOSIS — O98.512 HERPES VIRUS INFECTION IN MOTHER DURING SECOND TRIMESTER OF PREGNANCY: ICD-10-CM

## 2025-04-24 DIAGNOSIS — B00.9 HERPES VIRUS INFECTION IN MOTHER DURING SECOND TRIMESTER OF PREGNANCY: ICD-10-CM

## 2025-04-24 DIAGNOSIS — O99.212 OBESITY AFFECTING PREGNANCY IN SECOND TRIMESTER, UNSPECIFIED OBESITY TYPE: ICD-10-CM

## 2025-04-24 RX ORDER — FERROUS SULFATE 325(65) MG
325 TABLET ORAL EVERY OTHER DAY
Qty: 15 TABLET | Refills: 3 | Status: SHIPPED | OUTPATIENT
Start: 2025-04-24

## 2025-04-24 RX ORDER — FOLIC ACID 1 MG/1
1 TABLET ORAL EVERY OTHER DAY
Qty: 15 TABLET | Refills: 3 | Status: SHIPPED | OUTPATIENT
Start: 2025-04-24 | End: 2025-08-22

## 2025-05-12 DIAGNOSIS — D25.9 LEIOMYOMA OF UTERUS AFFECTING PREGNANCY IN SECOND TRIMESTER: Primary | ICD-10-CM

## 2025-05-12 DIAGNOSIS — Z36.89 ENCOUNTER FOR FETAL ANATOMIC SURVEY: ICD-10-CM

## 2025-05-12 DIAGNOSIS — O34.12 LEIOMYOMA OF UTERUS AFFECTING PREGNANCY IN SECOND TRIMESTER: Primary | ICD-10-CM

## 2025-05-12 DIAGNOSIS — O99.012 ANEMIA DURING PREGNANCY IN SECOND TRIMESTER: ICD-10-CM

## 2025-05-12 DIAGNOSIS — O99.212 OBESITY AFFECTING PREGNANCY IN SECOND TRIMESTER, UNSPECIFIED OBESITY TYPE: ICD-10-CM

## 2025-05-20 ENCOUNTER — HOSPITAL ENCOUNTER (OUTPATIENT)
Facility: HOSPITAL | Age: 30
LOS: 1 days | Discharge: HOME | End: 2025-05-22
Attending: STUDENT IN AN ORGANIZED HEALTH CARE EDUCATION/TRAINING PROGRAM | Admitting: OBSTETRICS & GYNECOLOGY
Payer: MEDICAID

## 2025-05-20 DIAGNOSIS — R11.10 HYPEREMESIS: ICD-10-CM

## 2025-05-20 DIAGNOSIS — O10.012 PRE-EXISTING ESSENTIAL HYPERTENSION COMPLICATING PREGNANCY IN SECOND TRIMESTER: ICD-10-CM

## 2025-05-20 DIAGNOSIS — O26.899 OTHER SPECIFIED PREGNANCY RELATED CONDITIONS, UNSPECIFIED TRIMESTER: ICD-10-CM

## 2025-05-20 DIAGNOSIS — R11.2 NAUSEA AND VOMITING, UNSPECIFIED VOMITING TYPE: Primary | ICD-10-CM

## 2025-05-20 DIAGNOSIS — R10.9 ABDOMINAL PAIN IN PREGNANCY: ICD-10-CM

## 2025-05-20 DIAGNOSIS — Z86.39 HX OF HYPOKALEMIA: ICD-10-CM

## 2025-05-20 DIAGNOSIS — E87.6 HYPOKALEMIA: ICD-10-CM

## 2025-05-20 DIAGNOSIS — R07.9 CHEST PAIN, UNSPECIFIED TYPE: ICD-10-CM

## 2025-05-20 DIAGNOSIS — O26.899 ABDOMINAL PAIN IN PREGNANCY: ICD-10-CM

## 2025-05-20 LAB
ALBUMIN SERPL-MCNC: 3.4 G/DL (ref 3.5–5)
ALBUMIN/GLOB SERPL: 0.7 RATIO (ref 1.1–2)
ALP SERPL-CCNC: 67 UNIT/L (ref 40–150)
ALT SERPL-CCNC: 19 UNIT/L (ref 0–55)
ANION GAP SERPL CALC-SCNC: 17 MEQ/L
AST SERPL-CCNC: 16 UNIT/L (ref 11–45)
BACTERIA #/AREA URNS AUTO: ABNORMAL /HPF
BASOPHILS # BLD AUTO: 0.01 X10(3)/MCL
BASOPHILS NFR BLD AUTO: 0.1 %
BILIRUB SERPL-MCNC: 1.6 MG/DL
BILIRUB UR QL STRIP.AUTO: ABNORMAL
BUN SERPL-MCNC: 5.3 MG/DL (ref 7–18.7)
CALCIUM SERPL-MCNC: 9.6 MG/DL (ref 8.4–10.2)
CHLORIDE SERPL-SCNC: 99 MMOL/L (ref 98–107)
CLARITY UR: ABNORMAL
CO2 SERPL-SCNC: 22 MMOL/L (ref 22–29)
COLOR UR AUTO: YELLOW
CREAT SERPL-MCNC: 0.62 MG/DL (ref 0.55–1.02)
CREAT/UREA NIT SERPL: 9
EOSINOPHIL # BLD AUTO: 0.01 X10(3)/MCL (ref 0–0.9)
EOSINOPHIL NFR BLD AUTO: 0.1 %
ERYTHROCYTE [DISTWIDTH] IN BLOOD BY AUTOMATED COUNT: 16.4 % (ref 11.5–17)
GFR SERPLBLD CREATININE-BSD FMLA CKD-EPI: >60 ML/MIN/1.73/M2
GLOBULIN SER-MCNC: 5.1 GM/DL (ref 2.4–3.5)
GLUCOSE SERPL-MCNC: 113 MG/DL (ref 74–100)
GLUCOSE UR QL STRIP: NORMAL
HCT VFR BLD AUTO: 37.7 % (ref 37–47)
HGB BLD-MCNC: 12.5 G/DL (ref 12–16)
HGB UR QL STRIP: ABNORMAL
IMM GRANULOCYTES # BLD AUTO: 0.07 X10(3)/MCL (ref 0–0.04)
IMM GRANULOCYTES NFR BLD AUTO: 0.4 %
KETONES UR QL STRIP: ABNORMAL
LEUKOCYTE ESTERASE UR QL STRIP: 75
LIPASE SERPL-CCNC: 28 U/L
LYMPHOCYTES # BLD AUTO: 1.49 X10(3)/MCL (ref 0.6–4.6)
LYMPHOCYTES NFR BLD AUTO: 9.1 %
MAGNESIUM SERPL-MCNC: 1.9 MG/DL (ref 1.6–2.6)
MCH RBC QN AUTO: 24.6 PG (ref 27–31)
MCHC RBC AUTO-ENTMCNC: 33.2 G/DL (ref 33–36)
MCV RBC AUTO: 74.1 FL (ref 80–94)
MONOCYTES # BLD AUTO: 1.1 X10(3)/MCL (ref 0.1–1.3)
MONOCYTES NFR BLD AUTO: 6.7 %
MUCOUS THREADS URNS QL MICRO: ABNORMAL /LPF
NEUTROPHILS # BLD AUTO: 13.64 X10(3)/MCL (ref 2.1–9.2)
NEUTROPHILS NFR BLD AUTO: 83.6 %
NITRITE UR QL STRIP: NEGATIVE
NRBC BLD AUTO-RTO: 0 %
PH UR STRIP: 5.5 [PH]
PLATELET # BLD AUTO: 294 X10(3)/MCL (ref 130–400)
PMV BLD AUTO: 11.5 FL (ref 7.4–10.4)
POTASSIUM SERPL-SCNC: 2.7 MMOL/L (ref 3.5–5.1)
PROT SERPL-MCNC: 8.5 GM/DL (ref 6.4–8.3)
PROT UR QL STRIP: ABNORMAL
RBC # BLD AUTO: 5.09 X10(6)/MCL (ref 4.2–5.4)
RBC #/AREA URNS AUTO: ABNORMAL /HPF
SODIUM SERPL-SCNC: 138 MMOL/L (ref 136–145)
SP GR UR STRIP.AUTO: 1.03 (ref 1–1.03)
SQUAMOUS #/AREA URNS LPF: ABNORMAL /HPF
TROPONIN I SERPL-MCNC: <0.01 NG/ML (ref 0–0.04)
UROBILINOGEN UR STRIP-ACNC: 2
WBC # BLD AUTO: 16.32 X10(3)/MCL (ref 4.5–11.5)
WBC #/AREA URNS AUTO: ABNORMAL /HPF

## 2025-05-20 PROCEDURE — G0378 HOSPITAL OBSERVATION PER HR: HCPCS

## 2025-05-20 PROCEDURE — 83615 LACTATE (LD) (LDH) ENZYME: CPT | Performed by: OBSTETRICS & GYNECOLOGY

## 2025-05-20 PROCEDURE — 96375 TX/PRO/DX INJ NEW DRUG ADDON: CPT

## 2025-05-20 PROCEDURE — 84550 ASSAY OF BLOOD/URIC ACID: CPT | Performed by: OBSTETRICS & GYNECOLOGY

## 2025-05-20 PROCEDURE — 96374 THER/PROPH/DIAG INJ IV PUSH: CPT

## 2025-05-20 PROCEDURE — 84484 ASSAY OF TROPONIN QUANT: CPT

## 2025-05-20 PROCEDURE — 63600175 PHARM REV CODE 636 W HCPCS

## 2025-05-20 PROCEDURE — 25000003 PHARM REV CODE 250

## 2025-05-20 PROCEDURE — 99285 EMERGENCY DEPT VISIT HI MDM: CPT | Mod: 25

## 2025-05-20 PROCEDURE — 93010 ELECTROCARDIOGRAM REPORT: CPT | Mod: ,,, | Performed by: INTERNAL MEDICINE

## 2025-05-20 PROCEDURE — 83735 ASSAY OF MAGNESIUM: CPT | Performed by: STUDENT IN AN ORGANIZED HEALTH CARE EDUCATION/TRAINING PROGRAM

## 2025-05-20 PROCEDURE — 80053 COMPREHEN METABOLIC PANEL: CPT

## 2025-05-20 PROCEDURE — 96376 TX/PRO/DX INJ SAME DRUG ADON: CPT

## 2025-05-20 PROCEDURE — 96361 HYDRATE IV INFUSION ADD-ON: CPT

## 2025-05-20 PROCEDURE — 63600175 PHARM REV CODE 636 W HCPCS: Performed by: STUDENT IN AN ORGANIZED HEALTH CARE EDUCATION/TRAINING PROGRAM

## 2025-05-20 PROCEDURE — 93005 ELECTROCARDIOGRAM TRACING: CPT

## 2025-05-20 PROCEDURE — 25000003 PHARM REV CODE 250: Performed by: STUDENT IN AN ORGANIZED HEALTH CARE EDUCATION/TRAINING PROGRAM

## 2025-05-20 PROCEDURE — 81001 URINALYSIS AUTO W/SCOPE: CPT

## 2025-05-20 PROCEDURE — 83690 ASSAY OF LIPASE: CPT

## 2025-05-20 PROCEDURE — 85025 COMPLETE CBC W/AUTO DIFF WBC: CPT

## 2025-05-20 RX ORDER — PROCHLORPERAZINE EDISYLATE 5 MG/ML
5 INJECTION INTRAMUSCULAR; INTRAVENOUS EVERY 6 HOURS PRN
Status: DISCONTINUED | OUTPATIENT
Start: 2025-05-20 | End: 2025-05-22 | Stop reason: HOSPADM

## 2025-05-20 RX ORDER — PROCHLORPERAZINE EDISYLATE 5 MG/ML
5 INJECTION INTRAMUSCULAR; INTRAVENOUS
Status: COMPLETED | OUTPATIENT
Start: 2025-05-20 | End: 2025-05-20

## 2025-05-20 RX ORDER — TALC
6 POWDER (GRAM) TOPICAL NIGHTLY PRN
Status: DISCONTINUED | OUTPATIENT
Start: 2025-05-20 | End: 2025-05-22 | Stop reason: HOSPADM

## 2025-05-20 RX ORDER — METOCLOPRAMIDE HYDROCHLORIDE 5 MG/ML
10 INJECTION INTRAMUSCULAR; INTRAVENOUS EVERY 6 HOURS PRN
Status: DISCONTINUED | OUTPATIENT
Start: 2025-05-20 | End: 2025-05-22 | Stop reason: HOSPADM

## 2025-05-20 RX ORDER — SODIUM CHLORIDE 0.9 % (FLUSH) 0.9 %
10 SYRINGE (ML) INJECTION
Status: DISCONTINUED | OUTPATIENT
Start: 2025-05-20 | End: 2025-05-22 | Stop reason: HOSPADM

## 2025-05-20 RX ORDER — PROMETHAZINE HYDROCHLORIDE 25 MG/ML
25 INJECTION, SOLUTION INTRAMUSCULAR; INTRAVENOUS
Status: DISCONTINUED | OUTPATIENT
Start: 2025-05-20 | End: 2025-05-20

## 2025-05-20 RX ORDER — POTASSIUM CHLORIDE 7.45 MG/ML
10 INJECTION INTRAVENOUS
Status: COMPLETED | OUTPATIENT
Start: 2025-05-20 | End: 2025-05-21

## 2025-05-20 RX ORDER — METOCLOPRAMIDE HYDROCHLORIDE 5 MG/ML
INJECTION INTRAMUSCULAR; INTRAVENOUS
Status: DISPENSED
Start: 2025-05-20 | End: 2025-05-21

## 2025-05-20 RX ORDER — SODIUM CHLORIDE 9 MG/ML
1000 INJECTION, SOLUTION INTRAVENOUS
Status: COMPLETED | OUTPATIENT
Start: 2025-05-20 | End: 2025-05-20

## 2025-05-20 RX ORDER — ACETAMINOPHEN 325 MG/1
650 TABLET ORAL EVERY 8 HOURS PRN
Status: DISCONTINUED | OUTPATIENT
Start: 2025-05-20 | End: 2025-05-22 | Stop reason: HOSPADM

## 2025-05-20 RX ORDER — METOCLOPRAMIDE HYDROCHLORIDE 5 MG/ML
10 INJECTION INTRAMUSCULAR; INTRAVENOUS
Status: COMPLETED | OUTPATIENT
Start: 2025-05-20 | End: 2025-05-20

## 2025-05-20 RX ADMIN — POTASSIUM CHLORIDE 10 MEQ: 7.46 INJECTION, SOLUTION INTRAVENOUS at 11:05

## 2025-05-20 RX ADMIN — POTASSIUM BICARBONATE 60 MEQ: 391 TABLET, EFFERVESCENT ORAL at 10:05

## 2025-05-20 RX ADMIN — SODIUM CHLORIDE 1000 ML: 9 INJECTION, SOLUTION INTRAVENOUS at 09:05

## 2025-05-20 RX ADMIN — PROCHLORPERAZINE EDISYLATE 5 MG: 5 INJECTION INTRAMUSCULAR; INTRAVENOUS at 10:05

## 2025-05-20 RX ADMIN — METOCLOPRAMIDE 10 MG: 5 INJECTION, SOLUTION INTRAMUSCULAR; INTRAVENOUS at 09:05

## 2025-05-20 RX ADMIN — POTASSIUM CHLORIDE 10 MEQ: 7.46 INJECTION, SOLUTION INTRAVENOUS at 10:05

## 2025-05-20 NOTE — Clinical Note
Diagnosis: Abdominal pain in pregnancy [822228]   Future Attending Provider: BENITEZ ANDREA JR. [[id number]]   Admit to which facility:: [facility] [[id number]]

## 2025-05-21 PROBLEM — Z3A.19 19 WEEKS GESTATION OF PREGNANCY: Status: ACTIVE | Noted: 2025-05-21

## 2025-05-21 LAB
ANION GAP SERPL CALC-SCNC: 10 MEQ/L
BASOPHILS # BLD AUTO: 0.02 X10(3)/MCL
BASOPHILS NFR BLD AUTO: 0.2 %
BUN SERPL-MCNC: 3.9 MG/DL (ref 7–18.7)
CALCIUM SERPL-MCNC: 9.1 MG/DL (ref 8.4–10.2)
CHLORIDE SERPL-SCNC: 104 MMOL/L (ref 98–107)
CO2 SERPL-SCNC: 24 MMOL/L (ref 22–29)
CREAT SERPL-MCNC: 0.51 MG/DL (ref 0.55–1.02)
CREAT UR-MCNC: 35.4 MG/DL (ref 45–106)
CREAT/UREA NIT SERPL: 8
EOSINOPHIL # BLD AUTO: 0.01 X10(3)/MCL (ref 0–0.9)
EOSINOPHIL NFR BLD AUTO: 0.1 %
ERYTHROCYTE [DISTWIDTH] IN BLOOD BY AUTOMATED COUNT: 16.8 % (ref 11.5–17)
GFR SERPLBLD CREATININE-BSD FMLA CKD-EPI: >60 ML/MIN/1.73/M2
GLUCOSE SERPL-MCNC: 102 MG/DL (ref 74–100)
HCT VFR BLD AUTO: 32 % (ref 37–47)
HGB BLD-MCNC: 10.2 G/DL (ref 12–16)
IMM GRANULOCYTES # BLD AUTO: 0.04 X10(3)/MCL (ref 0–0.04)
IMM GRANULOCYTES NFR BLD AUTO: 0.3 %
LDH SERPL-CCNC: 221 U/L (ref 125–220)
LYMPHOCYTES # BLD AUTO: 1.74 X10(3)/MCL (ref 0.6–4.6)
LYMPHOCYTES NFR BLD AUTO: 13.4 %
MCH RBC QN AUTO: 24.1 PG (ref 27–31)
MCHC RBC AUTO-ENTMCNC: 31.9 G/DL (ref 33–36)
MCV RBC AUTO: 75.7 FL (ref 80–94)
MONOCYTES # BLD AUTO: 1.32 X10(3)/MCL (ref 0.1–1.3)
MONOCYTES NFR BLD AUTO: 10.2 %
NEUTROPHILS # BLD AUTO: 9.86 X10(3)/MCL (ref 2.1–9.2)
NEUTROPHILS NFR BLD AUTO: 75.8 %
NRBC BLD AUTO-RTO: 0 %
OHS QRS DURATION: 78 MS
OHS QTC CALCULATION: 478 MS
PLATELET # BLD AUTO: 239 X10(3)/MCL (ref 130–400)
PMV BLD AUTO: 11.9 FL (ref 7.4–10.4)
POTASSIUM SERPL-SCNC: 3.4 MMOL/L (ref 3.5–5.1)
PROT UR STRIP-MCNC: <6.8 MG/DL
RBC # BLD AUTO: 4.23 X10(6)/MCL (ref 4.2–5.4)
SODIUM SERPL-SCNC: 138 MMOL/L (ref 136–145)
TROPONIN I SERPL-MCNC: 0.02 NG/ML (ref 0–0.04)
URATE SERPL-MCNC: 5.7 MG/DL (ref 2.6–6)
WBC # BLD AUTO: 12.99 X10(3)/MCL (ref 4.5–11.5)

## 2025-05-21 PROCEDURE — 36415 COLL VENOUS BLD VENIPUNCTURE: CPT | Performed by: STUDENT IN AN ORGANIZED HEALTH CARE EDUCATION/TRAINING PROGRAM

## 2025-05-21 PROCEDURE — 36415 COLL VENOUS BLD VENIPUNCTURE: CPT | Performed by: NURSE PRACTITIONER

## 2025-05-21 PROCEDURE — 25000003 PHARM REV CODE 250: Performed by: OBSTETRICS & GYNECOLOGY

## 2025-05-21 PROCEDURE — 99232 SBSQ HOSP IP/OBS MODERATE 35: CPT | Mod: ,,, | Performed by: NURSE PRACTITIONER

## 2025-05-21 PROCEDURE — 63600175 PHARM REV CODE 636 W HCPCS: Performed by: OBSTETRICS & GYNECOLOGY

## 2025-05-21 PROCEDURE — G0378 HOSPITAL OBSERVATION PER HR: HCPCS

## 2025-05-21 PROCEDURE — 82570 ASSAY OF URINE CREATININE: CPT | Performed by: NURSE PRACTITIONER

## 2025-05-21 PROCEDURE — 84484 ASSAY OF TROPONIN QUANT: CPT | Performed by: NURSE PRACTITIONER

## 2025-05-21 PROCEDURE — 63600175 PHARM REV CODE 636 W HCPCS: Performed by: STUDENT IN AN ORGANIZED HEALTH CARE EDUCATION/TRAINING PROGRAM

## 2025-05-21 PROCEDURE — 96361 HYDRATE IV INFUSION ADD-ON: CPT

## 2025-05-21 PROCEDURE — 80048 BASIC METABOLIC PNL TOTAL CA: CPT | Performed by: STUDENT IN AN ORGANIZED HEALTH CARE EDUCATION/TRAINING PROGRAM

## 2025-05-21 PROCEDURE — 25000003 PHARM REV CODE 250: Performed by: NURSE PRACTITIONER

## 2025-05-21 PROCEDURE — 25000003 PHARM REV CODE 250: Performed by: STUDENT IN AN ORGANIZED HEALTH CARE EDUCATION/TRAINING PROGRAM

## 2025-05-21 PROCEDURE — 27000492 HC SLEEVE, SCD T/L

## 2025-05-21 PROCEDURE — 85025 COMPLETE CBC W/AUTO DIFF WBC: CPT | Performed by: STUDENT IN AN ORGANIZED HEALTH CARE EDUCATION/TRAINING PROGRAM

## 2025-05-21 PROCEDURE — 25000242 PHARM REV CODE 250 ALT 637 W/ HCPCS: Performed by: NURSE PRACTITIONER

## 2025-05-21 RX ORDER — LABETALOL 100 MG/1
100 TABLET, FILM COATED ORAL EVERY 12 HOURS
Qty: 60 TABLET | Refills: 4 | Status: SHIPPED | OUTPATIENT
Start: 2025-05-21 | End: 2025-10-18

## 2025-05-21 RX ORDER — LANOLIN ALCOHOL/MO/W.PET/CERES
50 CREAM (GRAM) TOPICAL EVERY 12 HOURS
Status: DISCONTINUED | OUTPATIENT
Start: 2025-05-21 | End: 2025-05-22 | Stop reason: HOSPADM

## 2025-05-21 RX ORDER — POTASSIUM CHLORIDE 20 MEQ/1
20 TABLET, EXTENDED RELEASE ORAL 2 TIMES DAILY
Status: DISCONTINUED | OUTPATIENT
Start: 2025-05-21 | End: 2025-05-22 | Stop reason: HOSPADM

## 2025-05-21 RX ORDER — CALCIUM CARBONATE 200(500)MG
1000 TABLET,CHEWABLE ORAL 3 TIMES DAILY PRN
Status: DISCONTINUED | OUTPATIENT
Start: 2025-05-21 | End: 2025-05-22 | Stop reason: HOSPADM

## 2025-05-21 RX ORDER — SODIUM CHLORIDE, SODIUM LACTATE, POTASSIUM CHLORIDE, CALCIUM CHLORIDE 600; 310; 30; 20 MG/100ML; MG/100ML; MG/100ML; MG/100ML
INJECTION, SOLUTION INTRAVENOUS CONTINUOUS
Status: DISCONTINUED | OUTPATIENT
Start: 2025-05-21 | End: 2025-05-22 | Stop reason: HOSPADM

## 2025-05-21 RX ORDER — LABETALOL 100 MG/1
100 TABLET, FILM COATED ORAL EVERY 12 HOURS
Status: DISCONTINUED | OUTPATIENT
Start: 2025-05-21 | End: 2025-05-22 | Stop reason: HOSPADM

## 2025-05-21 RX ORDER — POTASSIUM CHLORIDE 20 MEQ/1
20 TABLET, EXTENDED RELEASE ORAL 2 TIMES DAILY
Qty: 4 TABLET | Refills: 0 | Status: SHIPPED | OUTPATIENT
Start: 2025-05-21 | End: 2025-05-23

## 2025-05-21 RX ORDER — FAMOTIDINE 20 MG/1
20 TABLET, FILM COATED ORAL 2 TIMES DAILY
Status: DISCONTINUED | OUTPATIENT
Start: 2025-05-21 | End: 2025-05-22 | Stop reason: HOSPADM

## 2025-05-21 RX ADMIN — FAMOTIDINE 20 MG: 20 TABLET, FILM COATED ORAL at 08:05

## 2025-05-21 RX ADMIN — METOCLOPRAMIDE 10 MG: 5 INJECTION, SOLUTION INTRAMUSCULAR; INTRAVENOUS at 03:05

## 2025-05-21 RX ADMIN — CALCIUM CARBONATE (ANTACID) CHEW TAB 500 MG 1000 MG: 500 CHEW TAB at 12:05

## 2025-05-21 RX ADMIN — POTASSIUM CHLORIDE 10 MEQ: 7.46 INJECTION, SOLUTION INTRAVENOUS at 02:05

## 2025-05-21 RX ADMIN — CALCIUM CARBONATE (ANTACID) CHEW TAB 500 MG 1000 MG: 500 CHEW TAB at 08:05

## 2025-05-21 RX ADMIN — SODIUM CHLORIDE, POTASSIUM CHLORIDE, SODIUM LACTATE AND CALCIUM CHLORIDE: 600; 310; 30; 20 INJECTION, SOLUTION INTRAVENOUS at 12:05

## 2025-05-21 RX ADMIN — LABETALOL HYDROCHLORIDE 100 MG: 100 TABLET, FILM COATED ORAL at 10:05

## 2025-05-21 RX ADMIN — ACETAMINOPHEN 650 MG: 325 TABLET ORAL at 12:05

## 2025-05-21 RX ADMIN — LABETALOL HYDROCHLORIDE 100 MG: 100 TABLET, FILM COATED ORAL at 08:05

## 2025-05-21 RX ADMIN — DOXYLAMINE SUCCINATE 25 MG: 25 TABLET ORAL at 10:05

## 2025-05-21 RX ADMIN — PYRIDOXINE HCL TAB 50 MG 50 MG: 50 TAB at 10:05

## 2025-05-21 RX ADMIN — SODIUM CHLORIDE, POTASSIUM CHLORIDE, SODIUM LACTATE AND CALCIUM CHLORIDE: 600; 310; 30; 20 INJECTION, SOLUTION INTRAVENOUS at 04:05

## 2025-05-21 RX ADMIN — SODIUM CHLORIDE, POTASSIUM CHLORIDE, SODIUM LACTATE AND CALCIUM CHLORIDE: 600; 310; 30; 20 INJECTION, SOLUTION INTRAVENOUS at 08:05

## 2025-05-21 RX ADMIN — POTASSIUM CHLORIDE 20 MEQ: 1500 TABLET, EXTENDED RELEASE ORAL at 10:05

## 2025-05-21 RX ADMIN — ACETAMINOPHEN 650 MG: 325 TABLET ORAL at 08:05

## 2025-05-21 RX ADMIN — POTASSIUM CHLORIDE 20 MEQ: 1500 TABLET, EXTENDED RELEASE ORAL at 08:05

## 2025-05-21 RX ADMIN — CALCIUM CARBONATE (ANTACID) CHEW TAB 500 MG 1000 MG: 500 CHEW TAB at 04:05

## 2025-05-21 RX ADMIN — DOXYLAMINE SUCCINATE 25 MG: 25 TABLET ORAL at 08:05

## 2025-05-21 NOTE — CONSULTS
Maternal Fetal Medicine Consult Note    Chinyere Osorio is a 29 y.o.  female  at 19 weeks 1 day gestation admitted for nausea/vomiting.  She reports that she was doing well on home regimen of vitamin B6, doxylamine, and Phenergan but nausea/vomiting got worse the last few days.  Since admission, she reports feeling better.  She reports that she ate a sausage this morning without any nausea/vomiting.  She has no complaints at this time, denies any abdominal pain, nausea, vomiting, bleeding, headaches.      Review of Systems   12 point review of systems conducted, negative except as stated in the history of present illness. See HPI for details.    Past Medical History:   Diagnosis Date    Anxiety     Anxiety disorder 2019    Headache     Herpes simplex virus (HSV) infection     HSV 1/ Pt has never had a breakout        Past Surgical History:   Procedure Laterality Date     SECTION  2018    DILATION AND CURETTAGE OF UTERUS      Cyst Rupture    WISDOM TOOTH EXTRACTION      Two bottom teeth were removed              Family History   Problem Relation Name Age of Onset    Stomach cancer Paternal Grandmother      Eclampsia Mother      Hypertension Mother      Ovarian cysts Mother      Fibroids Mother      Hypertension Sister      Fibroids Sister      Ovarian cysts Sister      Cancer Maternal Aunt          Temp:  [98.6 °F (37 °C)-98.8 °F (37.1 °C)] 98.7 °F (37.1 °C)  Pulse:  [] 93  Resp:  [8-32] 17  SpO2:  [94 %-100 %] 98 %  BP: (120-161)/() 134/95    Physical Exam  Vitals and nursing note reviewed.   Constitutional:       Appearance: Normal appearance.   HENT:      Head: Normocephalic and atraumatic.   Cardiovascular:      Rate and Rhythm: Normal rate and regular rhythm.   Pulmonary:      Effort: Pulmonary effort is normal. No respiratory distress.      Breath sounds: Normal breath sounds.   Abdominal:      Palpations: Abdomen is soft.      Tenderness: There is no abdominal  tenderness.   Musculoskeletal:      Right lower leg: No edema.      Left lower leg: No edema.   Skin:     General: Skin is warm and dry.   Neurological:      Mental Status: She is alert and oriented to person, place, and time.   Psychiatric:         Mood and Affect: Mood normal.         Behavior: Behavior normal.         Thought Content: Thought content normal.         Judgment: Judgment normal.          Recent Results (from the past 48 hours)   Urinalysis, Reflex to Urine Culture Urine, Clean Catch    Collection Time: 05/20/25  9:01 PM    Specimen: Urine   Result Value Ref Range    Color, UA Yellow Yellow, Light-Yellow, Colorless, Straw, Dark-Yellow    Appearance, UA Turbid (A) Clear    Specific Gravity, UA 1.028 1.005 - 1.030    pH, UA 5.5 5.0 - 8.5    Protein, UA 1+ (A) Negative    Glucose, UA Normal Negative, Normal    Ketones, UA 4+ (A) Negative    Blood, UA Trace (A) Negative    Bilirubin, UA 1+ (A) Negative    Urobilinogen, UA 2.0 (A) 0.2, 1.0, Normal    Nitrites, UA Negative Negative    Leukocyte Esterase, UA 75 (A) Negative    RBC, UA 0-5 None Seen, 0-2, 3-5, 0-5 /HPF    WBC, UA 6-10 (A) None Seen, 0-2, 3-5, 0-5 /HPF    Bacteria, UA Trace None Seen, Trace /HPF    Squamous Epithelial Cells, UA Occasional (A) None Seen, Trace /HPF    Mucous, UA Few (A) None Seen /LPF   Comp. Metabolic Panel    Collection Time: 05/20/25  9:10 PM   Result Value Ref Range    Sodium 138 136 - 145 mmol/L    Potassium 2.7 (LL) 3.5 - 5.1 mmol/L    Chloride 99 98 - 107 mmol/L    CO2 22 22 - 29 mmol/L    Glucose 113 (H) 74 - 100 mg/dL    Blood Urea Nitrogen 5.3 (L) 7.0 - 18.7 mg/dL    Creatinine 0.62 0.55 - 1.02 mg/dL    Calcium 9.6 8.4 - 10.2 mg/dL    Protein Total 8.5 (H) 6.4 - 8.3 gm/dL    Albumin 3.4 (L) 3.5 - 5.0 g/dL    Globulin 5.1 (H) 2.4 - 3.5 gm/dL    Albumin/Globulin Ratio 0.7 (L) 1.1 - 2.0 ratio    Bilirubin Total 1.6 (H) <=1.5 mg/dL    ALP 67 40 - 150 unit/L    ALT 19 0 - 55 unit/L    AST 16 11 - 45 unit/L    eGFR >60  mL/min/1.73/m2    Anion Gap 17.0 mEq/L    BUN/Creatinine Ratio 9    Troponin I    Collection Time: 05/20/25  9:10 PM   Result Value Ref Range    Troponin-I <0.010 0.000 - 0.045 ng/mL   Lipase    Collection Time: 05/20/25  9:10 PM   Result Value Ref Range    Lipase Level 28 <=60 U/L   CBC with Differential    Collection Time: 05/20/25  9:10 PM   Result Value Ref Range    WBC 16.32 (H) 4.50 - 11.50 x10(3)/mcL    RBC 5.09 4.20 - 5.40 x10(6)/mcL    Hgb 12.5 12.0 - 16.0 g/dL    Hct 37.7 37.0 - 47.0 %    MCV 74.1 (L) 80.0 - 94.0 fL    MCH 24.6 (L) 27.0 - 31.0 pg    MCHC 33.2 33.0 - 36.0 g/dL    RDW 16.4 11.5 - 17.0 %    Platelet 294 130 - 400 x10(3)/mcL    MPV 11.5 (H) 7.4 - 10.4 fL    Neut % 83.6 %    Lymph % 9.1 %    Mono % 6.7 %    Eos % 0.1 %    Basophil % 0.1 %    Imm Grans % 0.4 %    Neut # 13.64 (H) 2.1 - 9.2 x10(3)/mcL    Lymph # 1.49 0.6 - 4.6 x10(3)/mcL    Mono # 1.10 0.1 - 1.3 x10(3)/mcL    Eos # 0.01 0 - 0.9 x10(3)/mcL    Baso # 0.01 <=0.2 x10(3)/mcL    Imm Gran # 0.07 (H) 0.00 - 0.04 x10(3)/mcL    NRBC% 0.0 %   Magnesium    Collection Time: 05/20/25  9:10 PM   Result Value Ref Range    Magnesium Level 1.90 1.60 - 2.60 mg/dL   Uric Acid    Collection Time: 05/20/25  9:10 PM   Result Value Ref Range    Uric Acid 5.7 2.6 - 6.0 mg/dL   Lactate Dehydrogenase    Collection Time: 05/20/25  9:10 PM   Result Value Ref Range    Lactate Dehydrogenase 221 (H) 125 - 220 U/L   Basic metabolic panel    Collection Time: 05/21/25  7:09 AM   Result Value Ref Range    Sodium 138 136 - 145 mmol/L    Potassium 3.4 (L) 3.5 - 5.1 mmol/L    Chloride 104 98 - 107 mmol/L    CO2 24 22 - 29 mmol/L    Glucose 102 (H) 74 - 100 mg/dL    Blood Urea Nitrogen 3.9 (L) 7.0 - 18.7 mg/dL    Creatinine 0.51 (L) 0.55 - 1.02 mg/dL    BUN/Creatinine Ratio 8     Calcium 9.1 8.4 - 10.2 mg/dL    Anion Gap 10.0 mEq/L    eGFR >60 mL/min/1.73/m2   CBC with Differential    Collection Time: 05/21/25  7:09 AM   Result Value Ref Range    WBC 12.99 (H) 4.50 -  11.50 x10(3)/mcL    RBC 4.23 4.20 - 5.40 x10(6)/mcL    Hgb 10.2 (L) 12.0 - 16.0 g/dL    Hct 32.0 (L) 37.0 - 47.0 %    MCV 75.7 (L) 80.0 - 94.0 fL    MCH 24.1 (L) 27.0 - 31.0 pg    MCHC 31.9 (L) 33.0 - 36.0 g/dL    RDW 16.8 11.5 - 17.0 %    Platelet 239 130 - 400 x10(3)/mcL    MPV 11.9 (H) 7.4 - 10.4 fL    Neut % 75.8 %    Lymph % 13.4 %    Mono % 10.2 %    Eos % 0.1 %    Basophil % 0.2 %    Imm Grans % 0.3 %    Neut # 9.86 (H) 2.1 - 9.2 x10(3)/mcL    Lymph # 1.74 0.6 - 4.6 x10(3)/mcL    Mono # 1.32 (H) 0.1 - 1.3 x10(3)/mcL    Eos # 0.01 0 - 0.9 x10(3)/mcL    Baso # 0.02 <=0.2 x10(3)/mcL    Imm Gran # 0.04 0.00 - 0.04 x10(3)/mcL    NRBC% 0.0 %        US OB Limited 1 Or More Gestations  Narrative: EXAMINATION:  US OB LIMITED 1 OR MORE GESTATIONS    CLINICAL HISTORY:  abdominal pain;  Other specified pregnancy related conditions, unspecified trimester    TECHNIQUE:  Transabdominal OB imaging performed.  Limited    COMPARISON:  04/08/2025    FINDINGS:  Gestation: Single intrauterine gestation.    Presentation: Cephalic    Cardiac activity: 161 bpm    Posterior placenta.  Indeterminate hypoechoic area at the posterior placenta near the basal plate measuring 4 x 3.6 x 3.1 cm.  Previously 4.4 x 3.7 x 3.4 cm.    Amniotic fluid  deepest vertical pocket 5.5 cm.  Impression: Single, live intrauterine gestation with cardiac activity documented    Indeterminate hypoechoic area at the posterior placenta.  Not significantly changed from previous.    The preliminary and final reports are concordant.    Electronically signed by: Jaylin Cisse  Date:    05/21/2025  Time:    10:20      Assessment/Plan    19 weeks 1 day with    N/V of pregnancy  Reviewed with her that nausea and vomiting affects about 50% of pregnancies, and additional 25% have nausea only. The severe form of the condition, hyperemesis gravidarum with significant weight loss, electrolyte abnormalities affect only about 0.3-1% of pregnancies. She does not appear to  be dehydrated and with stable labs, tolerating regular diet, appears stable for discharge to home at this time.  I advised her to take vitamin B6 25 mg three times a day, in addition to Doxylamine 25mg at bed time and 12.5 mg in am and 12.5 mg in afternoon.  She can use Pepcid 20 mg twice a day.  Additionally,  Phenergan 25 mg every six hours PO can be used as needed. She was advised on a low-fat bland diet, and eat frequent small meals, avoid juices, and avoid fluids with the meals.  If symptoms worsen, patient was advised to return to the emergency room or labor and delivery.      Mild hypokalemia  Significant improvement from admission lab to current potassium level of 3.4.  Continue potassium 20 mEq PO twice daily for 2 days.  We will send this prescription for her to take outpatient and recheck the potassium level later this week.      Mild anemia  She has been taking oral iron at home.  Advised her to hold off on that treatment until nausea/vomiting improved and consider to start iron supplementation 325 mg as well as vitamin-C 250 mg and folic acid 1 mg every other day in the next week or so.      Chronic hypertension   Patient has had several mild range blood pressures.  With in a day or blood pressure at this time and mild range blood pressures, suspect chronic hypertension.  Started patient on labetalol 100 mg every 12 hours.  Continue low-dose aspirin twice daily for preeclampsia prophylaxis.      TAWANA Joe  Discussed above with Dr. Hidalgo who provided recommendations and agreed with plan of care as above.      This note was created with the assistance of [employer] voice recognition software. There may be transcription errors as a result of using this technology, however minimal. Effort has been made to assure accuracy of transcription, but any obvious errors or omissions should be clarified with the author of the document.

## 2025-05-21 NOTE — CONSULTS
Inpatient consult to Cardiology  Consult performed by: Libby López FNP  Consult ordered by: Morro Mendosa MD        [facility]    Cardiology  Consult Note    Patient Name: Chinyere Osorio  MRN: [id number]  Admission Date: 2025  Hospital Length of Stay: 1 days  Code Status: Full Code   Attending Provider: Morro Mendosa MD   Consulting Provider: ARVIN Tolliver  Primary Care Physician: No, Primary Doctor  Principal Problem:<principal problem not specified>    Patient information was obtained from patient, past medical records, and ER records.     Subjective:     Chief Complaint/Reason for Consult: chest pain    HPI:   Ms. Osorio is a 30 y/o female, unknown to [facility], who is 19 week, 1 day gestation who presented to ED with hyperemesis gravidarum. K+ 2.7 on admit. She was treated with K+ supplementation and then began c/o chest pain. EKG SR with LVH    PMH: HTN, anxiety, HSV  PSH: C section, D&C uterus, wisdom tooth extraction  Family History: Mother- HTN, sister- HTN  Social History:     Previous Cardiac Diagnostics:   Venous US 2020:  No evidence of deep or superficial vein throombosis in the RLE    Past Medical History:   Diagnosis Date    Anxiety     Anxiety disorder 2019    Headache     Herpes simplex virus (HSV) infection     HSV 1/ Pt has never had a breakout     Past Surgical History:   Procedure Laterality Date     SECTION  2018    DILATION AND CURETTAGE OF UTERUS      Cyst Rupture    WISDOM TOOTH EXTRACTION      Two bottom teeth were removed     Review of patient's allergies indicates:   Allergen Reactions    Ondansetron hcl Hives and Other (See Comments)     No current facility-administered medications on file prior to encounter.     Current Outpatient Medications on File Prior to Encounter   Medication Sig    doxylamine succinate (UNISOM, DOXYLAMINE,) 25 mg tablet Take 1/2 tablet (12.5 mg) in the morning, 1/2 tablet (12.5  mg) in the afternoon, and 1 tablet (25mg) at bedtime.    ferrous sulfate (FEOSOL) 325 mg (65 mg iron) Tab tablet Take 1 tablet (325 mg total) by mouth every other day.    folic acid (FOLVITE) 1 MG tablet Take 1 tablet (1 mg total) by mouth every other day.    promethazine (PHENERGAN) 25 MG tablet Take 1 tablet (25 mg total) by mouth every 6 (six) hours as needed for Nausea.    prenatal vit/iron fum/folic ac (PRENATAL 1+1 ORAL) Take by mouth.    pyridoxine, vitamin B6, (B-6) 25 MG Tab Take 1 tablet (25 mg total) by mouth 3 (three) times daily. (Patient not taking: Reported on 4/24/2025)     Family History       Problem Relation (Age of Onset)    Cancer Maternal Aunt    Eclampsia Mother    Fibroids Mother, Sister    Hypertension Mother, Sister    Ovarian cysts Mother, Sister    Stomach cancer Paternal Grandmother          Tobacco Use    Smoking status: Never     Passive exposure: Never    Smokeless tobacco: Never   Substance and Sexual Activity    Alcohol use: Never     Comment: social    Drug use: Never    Sexual activity: Not Currently     Partners: Male       Review of Systems   Cardiovascular:  Positive for chest pain.   Gastrointestinal:  Positive for nausea.   Musculoskeletal: Negative.    Skin: Negative.    Neurological: Negative.      Objective:     Vital Signs (Most Recent):  Temp: 98.7 °F (37.1 °C) (05/21/25 1005)  Pulse: 95 (05/21/25 1321)  Resp: 19 (05/21/25 1321)  BP: (!) 148/88 (05/21/25 1321)  SpO2: 99 % (05/21/25 1321) Vital Signs (24h Range):  Temp:  [98.6 °F (37 °C)-98.8 °F (37.1 °C)] 98.7 °F (37.1 °C)  Pulse:  [] 95  Resp:  [8-32] 19  SpO2:  [94 %-100 %] 99 %  BP: (120-161)/() 148/88   Weight: 85.7 kg (189 lb)  Body mass index is 32.44 kg/m².  SpO2: 99 %     No intake or output data in the 24 hours ending 05/21/25 1409  Lines/Drains/Airways       Peripheral Intravenous Line  Duration                  Peripheral IV - Single Lumen 05/20/25 2122 20 G Posterior;Right Hand <1 day               "    Significant Labs:   Chemistries:   Recent Labs   Lab 05/20/25 2110 05/21/25  0709    138   K 2.7* 3.4*   CL 99 104   CO2 22 24   BUN 5.3* 3.9*   CREATININE 0.62 0.51*   CALCIUM 9.6 9.1   PROT 8.5*  --    BILITOT 1.6*  --    ALKPHOS 67  --    ALT 19  --    AST 16  --    MG 1.90  --    TROPONINI <0.010  --         CBC/Anemia Labs: Coags:    Recent Labs   Lab 05/20/25 2110 05/21/25  0709   WBC 16.32* 12.99*   HGB 12.5 10.2*   HCT 37.7 32.0*    239   MCV 74.1* 75.7*   RDW 16.4 16.8    No results for input(s): "PT", "INR", "APTT" in the last 168 hours.     Significant Imaging:  Imaging Results              US OB Limited 1 Or More Gestations (Final result)  Result time 05/21/25 10:20:50   Procedure changed from US OB 14+ Wks, TransAbd, Single Gestation     Final result by Jaylin Cisse MD (05/21/25 10:20:50)                   Impression:      Single, live intrauterine gestation with cardiac activity documented    Indeterminate hypoechoic area at the posterior placenta.  Not significantly changed from previous.    The preliminary and final reports are concordant.      Electronically signed by: Jaylin Cisse  Date:    05/21/2025  Time:    10:20               Narrative:    EXAMINATION:  US OB LIMITED 1 OR MORE GESTATIONS    CLINICAL HISTORY:  abdominal pain;  Other specified pregnancy related conditions, unspecified trimester    TECHNIQUE:  Transabdominal OB imaging performed.  Limited    COMPARISON:  04/08/2025    FINDINGS:  Gestation: Single intrauterine gestation.    Presentation: Cephalic    Cardiac activity: 161 bpm    Posterior placenta.  Indeterminate hypoechoic area at the posterior placenta near the basal plate measuring 4 x 3.6 x 3.1 cm.  Previously 4.4 x 3.7 x 3.4 cm.    Amniotic fluid  deepest vertical pocket 5.5 cm.                        Preliminary result by Shalom Zaidi Jr., MD (05/20/25 21:12:35)                   Impression:    1. A gravid uterus is present with a " gestational sac and fetal pole identified.  2. A fetal heart rate of 161 beats per minute is noted.  3. Maximum Vertical pocket is 5.5 cm which is within normal limits.  4. Recommend serial clinical laboratory and ultrasound follow-up as indicated.               Narrative:    START OF REPORT:  Technique: 2nd trimester transabdominal ultrasound of the pelvis was performed.    Comparison: Comparison is with study dated 2025-04-08 13:14:04.    Clinical history: ER waiting abdominal pain and vomiting during pregnancy.    Findings:  Uterus: A gravid uterus is present with a gestational sac and fetal pole identified. A stable ovoid hypoechoic structure is again seen posterior to the placenta still measuring around 4 x 3.2 x 3.6 cm (previously 4.4 x 3.7 x 3.4 cm).    Fetus:  Heart rate: A fetal heart rate of 161 beats per minute is noted.  Fetal Biometry: Maximum Vertical pocket is 5.5 cm which is within normal limits.                                      EKG:         Physical Exam  Cardiovascular:      Rate and Rhythm: Normal rate and regular rhythm.      Pulses: Normal pulses.      Heart sounds: Normal heart sounds.   Pulmonary:      Effort: Pulmonary effort is normal.      Breath sounds: Normal breath sounds.   Abdominal:      Palpations: Abdomen is soft.   Musculoskeletal:         General: Normal range of motion.   Skin:     General: Skin is warm.      Capillary Refill: Capillary refill takes less than 2 seconds.   Neurological:      General: No focal deficit present.      Mental Status: She is alert.   Psychiatric:         Mood and Affect: Mood normal.       Home Medications:   Medications Ordered Prior to Encounter[1]  Current Schedule Inpatient Medications:   doxylamine succinate  25 mg Oral Q12H    famotidine  20 mg Oral BID    labetalol  100 mg Oral Q12H    potassium chloride  20 mEq Oral BID    pyridoxine (vitamin B6)  50 mg Oral Q12H     Continuous Infusions:   lactated ringers   Intravenous Continuous 125 mL/hr  at 05/21/25 0844 New Bag at 05/21/25 0844     Assessment:   Chest pain  Hyperemesis gravidarum  Hypokalemia  --repleted  HHD  LVH  Leukocytosis    Plan:   Obtain echo  Continue Labetalol 100 mg bid      Thank you for your consult.     Libby López, ARVIN  Cardiology  [facility]   Physician addendum:  I have seen and examined this patient as a split-shared visit with the YOSSI d/t complicated medical management of above problems written in assessment and high acuity requiring physician expertise in medical decision-making. I performed the substantive portion of the history and exam. Above medical decision-making is also formulated by me.    Cardiovascular exam:  S1, S2  Lungs:  fine crackles at bases.  Extremities:  + edema bilaterally    Plan:  Medications as above.  Continue supportive therapy.     Rolly Yo MD  Cardiologist         [1]   No current facility-administered medications on file prior to encounter.     Current Outpatient Medications on File Prior to Encounter   Medication Sig Dispense Refill    doxylamine succinate (UNISOM, DOXYLAMINE,) 25 mg tablet Take 1/2 tablet (12.5 mg) in the morning, 1/2 tablet (12.5 mg) in the afternoon, and 1 tablet (25mg) at bedtime. 60 tablet 3    ferrous sulfate (FEOSOL) 325 mg (65 mg iron) Tab tablet Take 1 tablet (325 mg total) by mouth every other day. 15 tablet 3    folic acid (FOLVITE) 1 MG tablet Take 1 tablet (1 mg total) by mouth every other day. 15 tablet 3    promethazine (PHENERGAN) 25 MG tablet Take 1 tablet (25 mg total) by mouth every 6 (six) hours as needed for Nausea. 30 tablet 3    prenatal vit/iron fum/folic ac (PRENATAL 1+1 ORAL) Take by mouth.      pyridoxine, vitamin B6, (B-6) 25 MG Tab Take 1 tablet (25 mg total) by mouth 3 (three) times daily. (Patient not taking: Reported on 4/24/2025) 90 tablet 3

## 2025-05-21 NOTE — ED PROVIDER NOTES
Encounter Date: 2025    SCRIBE #1 NOTE: I, Sakshi Chyna, am scribing for, and in the presence of,  Juan F Rios MD. I have scribed the following portions of the note - Other sections scribed: HPI, ROS, PE.       History     Chief Complaint   Patient presents with    Vomiting      woman 19 weeks pregnant, C/O epigastric abd pain and vomiting, onset . Last phenergan suppository 1330 today. Noted blood tinged emesis today.      Patient is a 29 year old female who presents to the ED for nausea and hematemesis today. She also reports abdominal pain. Patient states she follows with MD Rocky because this is a high-risk pregnancy. She denies vaginal pain and bleeding.     Patient is 19 weeks pregnant, .     The history is provided by the patient and medical records.     Review of patient's allergies indicates:   Allergen Reactions    Ondansetron hcl Hives and Other (See Comments)     Past Medical History:   Diagnosis Date    Anxiety     Anxiety disorder 2019    Headache     Herpes simplex virus (HSV) infection     HSV 1/ Pt has never had a breakout     Past Surgical History:   Procedure Laterality Date     SECTION  2018    DILATION AND CURETTAGE OF UTERUS      Cyst Rupture    WISDOM TOOTH EXTRACTION      Two bottom teeth were removed     Family History   Problem Relation Name Age of Onset    Stomach cancer Paternal Grandmother      Eclampsia Mother      Hypertension Mother      Ovarian cysts Mother      Fibroids Mother      Hypertension Sister      Fibroids Sister      Ovarian cysts Sister      Cancer Maternal Aunt       Social History[1]  Review of Systems   Constitutional:  Negative for chills and fever.   HENT:  Negative for congestion, rhinorrhea and sore throat.    Eyes:  Negative for visual disturbance.   Respiratory:  Negative for cough and shortness of breath.    Cardiovascular:  Negative for chest pain and leg swelling.   Gastrointestinal:  Positive for abdominal pain, nausea  and vomiting.   Genitourinary:  Negative for dysuria, hematuria, vaginal bleeding, vaginal discharge and vaginal pain.   Musculoskeletal:  Negative for joint swelling.   Skin:  Negative for rash.   Neurological:  Negative for weakness.   Psychiatric/Behavioral:  Negative for confusion.        Physical Exam     Initial Vitals [05/20/25 2027]   BP Pulse Resp Temp SpO2   (!) 137/92 (!) 117 17 98.6 °F (37 °C) 98 %      MAP       --         Physical Exam    Nursing note and vitals reviewed.  Constitutional: She is not diaphoretic. No distress.   HENT:   Head: Normocephalic and atraumatic. Mouth/Throat: Mucous membranes are dry.   Neck: Neck supple.   Normal range of motion.  Cardiovascular:  Normal rate and regular rhythm.           No murmur heard.  Pulmonary/Chest: Breath sounds normal. No respiratory distress.   Abdominal: Abdomen is soft. She exhibits no distension. There is no abdominal tenderness.   Gravid abdomen.    Musculoskeletal:      Cervical back: Normal range of motion and neck supple.     Neurological: She is alert and oriented to person, place, and time. She has normal strength. No cranial nerve deficit or sensory deficit.   Skin: Skin is warm. Capillary refill takes less than 2 seconds.   Psychiatric: She has a normal mood and affect.         ED Course   Procedures  Labs Reviewed   COMPREHENSIVE METABOLIC PANEL - Abnormal       Result Value    Sodium 138      Potassium 2.7 (*)     Chloride 99      CO2 22      Glucose 113 (*)     Blood Urea Nitrogen 5.3 (*)     Creatinine 0.62      Calcium 9.6      Protein Total 8.5 (*)     Albumin 3.4 (*)     Globulin 5.1 (*)     Albumin/Globulin Ratio 0.7 (*)     Bilirubin Total 1.6 (*)     ALP 67      ALT 19      AST 16      eGFR >60      Anion Gap 17.0      BUN/Creatinine Ratio 9     URINALYSIS, REFLEX TO URINE CULTURE - Abnormal    Color, UA Yellow      Appearance, UA Turbid (*)     Specific Gravity, UA 1.028      pH, UA 5.5      Protein, UA 1+ (*)     Glucose, UA  Normal      Ketones, UA 4+ (*)     Blood, UA Trace (*)     Bilirubin, UA 1+ (*)     Urobilinogen, UA 2.0 (*)     Nitrites, UA Negative      Leukocyte Esterase, UA 75 (*)     RBC, UA 0-5      WBC, UA 6-10 (*)     Bacteria, UA Trace      Squamous Epithelial Cells, UA Occasional (*)     Mucous, UA Few (*)    CBC WITH DIFFERENTIAL - Abnormal    WBC 16.32 (*)     RBC 5.09      Hgb 12.5      Hct 37.7      MCV 74.1 (*)     MCH 24.6 (*)     MCHC 33.2      RDW 16.4      Platelet 294      MPV 11.5 (*)     Neut % 83.6      Lymph % 9.1      Mono % 6.7      Eos % 0.1      Basophil % 0.1      Imm Grans % 0.4      Neut # 13.64 (*)     Lymph # 1.49      Mono # 1.10      Eos # 0.01      Baso # 0.01      Imm Gran # 0.07 (*)     NRBC% 0.0     TROPONIN I - Normal    Troponin-I <0.010     LIPASE - Normal    Lipase Level 28     MAGNESIUM - Normal    Magnesium Level 1.90     CBC W/ AUTO DIFFERENTIAL    Narrative:     The following orders were created for panel order CBC W/ AUTO DIFFERENTIAL.  Procedure                               Abnormality         Status                     ---------                               -----------         ------                     CBC with Differential[[id number]]       Abnormal            Final result                 Please view results for these tests on the individual orders.   URIC ACID   LACTATE DEHYDROGENASE          Imaging Results              US OB Limited 1 Or More Gestations (Preliminary result)  Result time 05/20/25 21:12:35   Procedure changed from US OB 14+ Wks, TransAbd, Single Gestation     Preliminary result by Shalom Zaidi Jr., MD (05/20/25 21:12:35)                   Narrative:    START OF REPORT:  Technique: 2nd trimester transabdominal ultrasound of the pelvis was performed.    Comparison: Comparison is with study dated 2025-04-08 13:14:04.    Clinical history: ER waiting abdominal pain and vomiting during pregnancy.    Findings:  Uterus: A gravid uterus is present with a  gestational sac and fetal pole identified. A stable ovoid hypoechoic structure is again seen posterior to the placenta still measuring around 4 x 3.2 x 3.6 cm (previously 4.4 x 3.7 x 3.4 cm).    Fetus:  Heart rate: A fetal heart rate of 161 beats per minute is noted.  Fetal Biometry: Maximum Vertical pocket is 5.5 cm which is within normal limits.      Impression:  1. A gravid uterus is present with a gestational sac and fetal pole identified.  2. A fetal heart rate of 161 beats per minute is noted.  3. Maximum Vertical pocket is 5.5 cm which is within normal limits.  4. Recommend serial clinical laboratory and ultrasound follow-up as indicated.                                         Medications   metoclopramide (Reglan) 5 mg/mL injection (has no administration in time range)   sodium chloride 0.9% flush 10 mL (has no administration in time range)   melatonin tablet 6 mg (has no administration in time range)   acetaminophen tablet 650 mg (has no administration in time range)   prochlorperazine injection Soln 5 mg (has no administration in time range)   metoclopramide injection 10 mg (has no administration in time range)   potassium chloride 10 mEq in 100 mL IVPB (10 mEq Intravenous New Bag 5/20/25 2253)   0.9% NaCl infusion (0 mLs Intravenous Stopped 5/20/25 2222)   metoclopramide injection 10 mg (10 mg Intravenous Given 5/20/25 2130)   prochlorperazine injection Soln 5 mg (5 mg Intravenous Given 5/20/25 2234)   potassium bicarbonate disintegrating tablet 60 mEq (60 mEq Oral Given 5/20/25 2253)     Medical Decision Making  28 yo presenting with n/v, previous admission for such, 20 weeks pregnant  Exam as above, hypokalemic on labs, dehydrated  No pregnancy issues  Will hydrate, replete potassium, discussed with Dr. Job Renea her OB who will admit     Differential diagnosis includes, but is not limited to,:  Hyperemesis gravidarum, dehydration, maryam, electrolyt ederangement     Problems Addressed:  Abdominal pain  in pregnancy: acute illness or injury that poses a threat to life or bodily functions  Hyperemesis: acute illness or injury that poses a threat to life or bodily functions  Hypokalemia: acute illness or injury that poses a threat to life or bodily functions  Nausea and vomiting, unspecified vomiting type: acute illness or injury that poses a threat to life or bodily functions    Amount and/or Complexity of Data Reviewed  Labs: ordered.  Radiology: ordered and independent interpretation performed.  Discussion of management or test interpretation with external provider(s): Discussed with OB Dr. Renae  - will admit     Risk  OTC drugs.  Prescription drug management.            Scribe Attestation:   Scribe #1: I performed the above scribed service and the documentation accurately describes the services I performed. I attest to the accuracy of the note.    Attending Attestation:           Physician Attestation for Scribe:  Physician Attestation Statement for Scribe #1: I, Juan F Rios MD, reviewed documentation, as scribed by Sakshi Clemente in my presence, and it is both accurate and complete.             ED Course as of 05/20/25 2333   Tue May 20, 2025   2223 Paged job renae   [AC]   2229 Discussed with MD Job [ED]   2230 Dr. Renae stated admit to him, can try Compazine for nausea control Benadryl p.o. if necessary [AC]      ED Course User Index  [AC] Juan F Rios IV, MD  [ED] Sakshi Clemente                           Clinical Impression:  Final diagnoses:  [O26.899, R10.9] Abdominal pain in pregnancy  [R11.2] Nausea and vomiting, unspecified vomiting type (Primary)  [R11.10] Hyperemesis  [E87.6] Hypokalemia          ED Disposition Condition    Observation                       [1]   Social History  Tobacco Use    Smoking status: Never     Passive exposure: Never    Smokeless tobacco: Never   Substance Use Topics    Alcohol use: Never     Comment: social    Drug use: Never        Juan F Rios IV, MD  05/20/25  [id number]

## 2025-05-21 NOTE — FIRST PROVIDER EVALUATION
"Medical screening examination initiated.  I have conducted a focused provider triage encounter, findings are as follows:    Brief history of present illness:  arrived to ED due to vomiting and upper abdominal pain. Symptoms began three days ago. 19 weeks pregnant. OB: Morro Mendosa. Denies vaginal bleeding. .    Vitals:    25   BP: (!) 137/92   BP Location: Left arm   Pulse: (!) 117   Resp: 17   Temp: 98.6 °F (37 °C)   TempSrc: Oral   SpO2: 98%   Weight: 85.7 kg (189 lb)   Height: 5' 4" (1.626 m)       Pertinent physical exam:  awake, alert, has non-labored breathing, ambulatory into triage    Brief workup plan:  labs, US, EKG     Preliminary workup initiated; this workup will be continued and followed by the physician or advanced practice provider that is assigned to the patient when roomed.  "

## 2025-05-22 VITALS
WEIGHT: 189 LBS | HEIGHT: 64 IN | SYSTOLIC BLOOD PRESSURE: 139 MMHG | TEMPERATURE: 98 F | OXYGEN SATURATION: 99 % | BODY MASS INDEX: 32.27 KG/M2 | DIASTOLIC BLOOD PRESSURE: 88 MMHG | RESPIRATION RATE: 18 BRPM | HEART RATE: 94 BPM

## 2025-05-22 LAB
AORTIC SIZE INDEX (SOV): 1.5 CM/M2
APICAL FOUR CHAMBER EJECTION FRACTION: 57 %
APICAL TWO CHAMBER EJECTION FRACTION: 67 %
AV INDEX (PROSTH): 0.77
AV MEAN GRADIENT: 4 MMHG
AV PEAK GRADIENT: 7 MMHG
AV VALVE AREA BY VELOCITY RATIO: 2.9 CM²
AV VALVE AREA: 2.7 CM²
AV VELOCITY RATIO: 0.85
BSA FOR ECHO PROCEDURE: 1.97 M2
CV ECHO LV RWT: 0.41 CM
DOP CALC AO PEAK VEL: 1.3 M/S
DOP CALC AO VTI: 25.6 CM
DOP CALC LVOT AREA: 3.5 CM2
DOP CALC LVOT DIAMETER: 2.1 CM
DOP CALC LVOT PEAK VEL: 1.1 M/S
DOP CALC LVOT STROKE VOLUME: 68.2 CM3
DOP CALC MV VTI: 25.2 CM
DOP CALCLVOT PEAK VEL VTI: 19.7 CM
E WAVE DECELERATION TIME: 224 MSEC
E/A RATIO: 1.26
E/E' RATIO: 6 M/S
ECHO LV POSTERIOR WALL: 0.9 CM (ref 0.6–1.1)
FRACTIONAL SHORTENING: 40.9 % (ref 28–44)
HR MV ECHO: 91 BPM
INTERVENTRICULAR SEPTUM: 0.8 CM (ref 0.6–1.1)
LEFT ATRIUM AREA SYSTOLIC (APICAL 2 CHAMBER): 15.5 CM2
LEFT ATRIUM AREA SYSTOLIC (APICAL 4 CHAMBER): 15.9 CM2
LEFT ATRIUM SIZE: 3.7 CM
LEFT ATRIUM VOLUME INDEX MOD: 20 ML/M2
LEFT ATRIUM VOLUME MOD: 39 ML
LEFT INTERNAL DIMENSION IN SYSTOLE: 2.6 CM (ref 2.1–4)
LEFT VENTRICLE DIASTOLIC VOLUME INDEX: 46.07 ML/M2
LEFT VENTRICLE DIASTOLIC VOLUME: 88 ML
LEFT VENTRICLE END DIASTOLIC VOLUME APICAL 2 CHAMBER: 72.7 ML
LEFT VENTRICLE END DIASTOLIC VOLUME APICAL 4 CHAMBER: 87.1 ML
LEFT VENTRICLE END SYSTOLIC VOLUME APICAL 2 CHAMBER: 39.2 ML
LEFT VENTRICLE END SYSTOLIC VOLUME APICAL 4 CHAMBER: 39 ML
LEFT VENTRICLE MASS INDEX: 62.1 G/M2
LEFT VENTRICLE SYSTOLIC VOLUME INDEX: 13.6 ML/M2
LEFT VENTRICLE SYSTOLIC VOLUME: 26 ML
LEFT VENTRICULAR INTERNAL DIMENSION IN DIASTOLE: 4.4 CM (ref 3.5–6)
LEFT VENTRICULAR MASS: 118.6 G
LV LATERAL E/E' RATIO: 4.8 M/S
LV SEPTAL E/E' RATIO: 7.9 M/S
LVED V (TEICH): 87.7 ML
LVES V (TEICH): 25.6 ML
LVOT MG: 3 MMHG
LVOT MV: 0.74 CM/S
MV MEAN GRADIENT: 3 MMHG
MV PEAK A VEL: 0.69 M/S
MV PEAK E VEL: 0.87 M/S
MV PEAK GRADIENT: 5 MMHG
MV STENOSIS PRESSURE HALF TIME: 58 MS
MV VALVE AREA BY CONTINUITY EQUATION: 2.71 CM2
MV VALVE AREA P 1/2 METHOD: 3.79 CM2
OHS LV EJECTION FRACTION SIMPSONS BIPLANE MOD: 62 %
OHS QRS DURATION: 74 MS
OHS QTC CALCULATION: 455 MS
PISA TR MAX VEL: 1.3 M/S
RA PRESSURE ESTIMATED: 3 MMHG
RV TB RVSP: 4 MMHG
SINUS: 2.8 CM
TDI LATERAL: 0.18 M/S
TDI SEPTAL: 0.11 M/S
TDI: 0.15 M/S
TR MAX PG: 7 MMHG
TRICUSPID ANNULAR PLANE SYSTOLIC EXCURSION: 1.9 CM
TV REST PULMONARY ARTERY PRESSURE: 10 MMHG
Z-SCORE OF LEFT VENTRICULAR DIMENSION IN END DIASTOLE: -2
Z-SCORE OF LEFT VENTRICULAR DIMENSION IN END SYSTOLE: -1.91

## 2025-05-22 PROCEDURE — 25000003 PHARM REV CODE 250: Performed by: NURSE PRACTITIONER

## 2025-05-22 PROCEDURE — 63600175 PHARM REV CODE 636 W HCPCS: Performed by: OBSTETRICS & GYNECOLOGY

## 2025-05-22 PROCEDURE — 93005 ELECTROCARDIOGRAM TRACING: CPT

## 2025-05-22 PROCEDURE — 93010 ELECTROCARDIOGRAM REPORT: CPT | Mod: ,,, | Performed by: INTERNAL MEDICINE

## 2025-05-22 PROCEDURE — 25000242 PHARM REV CODE 250 ALT 637 W/ HCPCS: Performed by: NURSE PRACTITIONER

## 2025-05-22 PROCEDURE — 25000003 PHARM REV CODE 250: Performed by: STUDENT IN AN ORGANIZED HEALTH CARE EDUCATION/TRAINING PROGRAM

## 2025-05-22 PROCEDURE — G0378 HOSPITAL OBSERVATION PER HR: HCPCS

## 2025-05-22 PROCEDURE — 96361 HYDRATE IV INFUSION ADD-ON: CPT

## 2025-05-22 RX ADMIN — SODIUM CHLORIDE, POTASSIUM CHLORIDE, SODIUM LACTATE AND CALCIUM CHLORIDE: 600; 310; 30; 20 INJECTION, SOLUTION INTRAVENOUS at 12:05

## 2025-05-22 RX ADMIN — ACETAMINOPHEN 650 MG: 325 TABLET ORAL at 04:05

## 2025-05-22 RX ADMIN — SODIUM CHLORIDE, POTASSIUM CHLORIDE, SODIUM LACTATE AND CALCIUM CHLORIDE: 600; 310; 30; 20 INJECTION, SOLUTION INTRAVENOUS at 08:05

## 2025-05-22 RX ADMIN — PYRIDOXINE HCL TAB 50 MG 50 MG: 50 TAB at 12:05

## 2025-05-22 RX ADMIN — ACETAMINOPHEN 650 MG: 325 TABLET ORAL at 01:05

## 2025-05-22 RX ADMIN — DOXYLAMINE SUCCINATE 25 MG: 25 TABLET ORAL at 09:05

## 2025-05-22 RX ADMIN — FAMOTIDINE 20 MG: 20 TABLET, FILM COATED ORAL at 09:05

## 2025-05-22 RX ADMIN — PYRIDOXINE HCL TAB 50 MG 50 MG: 50 TAB at 09:05

## 2025-05-22 NOTE — PROGRESS NOTES
[facility]    Cardiology  Progress Note    Patient Name: Chinyere Osorio  MRN: [id number]  Admission Date: 5/20/2025  Hospital Length of Stay: 1 days  Code Status: Full Code   Attending Provider: Morro Mendosa MD   Consulting Provider: ARVIN Tolliver  Primary Care Physician: Jazmine, Primary Doctor  Principal Problem:19 weeks gestation of pregnancy    Patient information was obtained from patient, past medical records, and ER records.     Subjective:     Chief Complaint/Reason for Consult: chest pain    HPI:   Ms. Osorio is a 28 y/o female, unknown to [facility], who is 19 week, 1 day gestation who presented to ED with hyperemesis gravidarum. K+ 2.7 on admit. She was treated with K+ supplementation and then began c/o chest pain. EKG SR with LVH      Hospital Course:  5.22.25: Patient resting in bed. Reports chest heaviness that is reproducible. Echo has not been completed yet      PMH: HTN, anxiety, HSV  PSH: C section, D&C uterus, wisdom tooth extraction  Family History: Mother- HTN, sister- HTN  Social History: non smoker      Previous Cardiac Diagnostics:   TTE 05.22.25:  Left Ventricle: The left ventricle is normal in size. Normal wall thickness. There is normal systolic function with a visually estimated ejection fraction of 60 - 65%. There is normal diastolic function.    Right Ventricle: Systolic function is normal. TAPSE is 1.9 cm.    Mitral Valve: The mitral valve is structurally normal. There is trace regurgitation.    IVC/SVC: Normal venous pressure at 3 mmHg.    Pericardium: There is no pericardial effusion.    Venous US 11.27.2020:  No evidence of deep or superficial vein throombosis in the RLE        Review of patient's allergies indicates:   Allergen Reactions    Ondansetron hcl Hives and Other (See Comments)     No current facility-administered medications on file prior to encounter.     Current Outpatient Medications on File Prior to Encounter   Medication Sig     doxylamine succinate (UNISOM, DOXYLAMINE,) 25 mg tablet Take 1/2 tablet (12.5 mg) in the morning, 1/2 tablet (12.5 mg) in the afternoon, and 1 tablet (25mg) at bedtime.    ferrous sulfate (FEOSOL) 325 mg (65 mg iron) Tab tablet Take 1 tablet (325 mg total) by mouth every other day.    folic acid (FOLVITE) 1 MG tablet Take 1 tablet (1 mg total) by mouth every other day.    promethazine (PHENERGAN) 25 MG tablet Take 1 tablet (25 mg total) by mouth every 6 (six) hours as needed for Nausea.    prenatal vit/iron fum/folic ac (PRENATAL 1+1 ORAL) Take by mouth.    pyridoxine, vitamin B6, (B-6) 25 MG Tab Take 1 tablet (25 mg total) by mouth 3 (three) times daily. (Patient not taking: Reported on 4/24/2025)       Review of Systems   Cardiovascular:  Positive for chest pain.   Gastrointestinal:  Negative for nausea.   Musculoskeletal: Negative.    Skin: Negative.    Neurological: Negative.      Objective:     Vital Signs (Most Recent):  Temp: 98.3 °F (36.8 °C) (05/22/25 0732)  Pulse: 80 (05/22/25 0923)  Resp: 19 (05/22/25 0923)  BP: (!) 139/93 (05/22/25 0913)  SpO2: 99 % (05/22/25 0923) Vital Signs (24h Range):  Temp:  [97.9 °F (36.6 °C)-98.7 °F (37.1 °C)] 98.3 °F (36.8 °C)  Pulse:  [] 80  Resp:  [7-38] 19  SpO2:  [90 %-100 %] 99 %  BP: (128-151)/() 139/93   Weight: 85.7 kg (189 lb)  Body mass index is 32.44 kg/m².  SpO2: 99 %       Intake/Output Summary (Last 24 hours) at 5/22/2025 1011  Last data filed at 5/22/2025 0646  Gross per 24 hour   Intake 3750.59 ml   Output --   Net 3750.59 ml     Lines/Drains/Airways       Peripheral Intravenous Line  Duration                  Peripheral IV - Single Lumen 05/20/25 2122 20 G Posterior;Right Hand 1 day                  Significant Labs:   Chemistries:   Recent Labs   Lab 05/20/25 2110 05/21/25  0709 05/21/25  1636    138  --    K 2.7* 3.4*  --    CL 99 104  --    CO2 22 24  --    BUN 5.3* 3.9*  --    CREATININE 0.62 0.51*  --    CALCIUM 9.6 9.1  --    PROT 8.5*   "--   --    BILITOT 1.6*  --   --    ALKPHOS 67  --   --    ALT 19  --   --    AST 16  --   --    MG 1.90  --   --    TROPONINI <0.010  --  0.017        CBC/Anemia Labs: Coags:    Recent Labs   Lab 05/20/25  2110 05/21/25  0709   WBC 16.32* 12.99*   HGB 12.5 10.2*   HCT 37.7 32.0*    239   MCV 74.1* 75.7*   RDW 16.4 16.8    No results for input(s): "PT", "INR", "APTT" in the last 168 hours.     Significant Imaging:  Imaging Results              US OB Limited 1 Or More Gestations (Final result)  Result time 05/21/25 10:20:50   Procedure changed from US OB 14+ Wks, TransAbd, Single Gestation     Final result by Jaylin Cisse MD (05/21/25 10:20:50)                   Impression:      Single, live intrauterine gestation with cardiac activity documented    Indeterminate hypoechoic area at the posterior placenta.  Not significantly changed from previous.    The preliminary and final reports are concordant.      Electronically signed by: Jaylin Cisse  Date:    05/21/2025  Time:    10:20               Narrative:    EXAMINATION:  US OB LIMITED 1 OR MORE GESTATIONS    CLINICAL HISTORY:  abdominal pain;  Other specified pregnancy related conditions, unspecified trimester    TECHNIQUE:  Transabdominal OB imaging performed.  Limited    COMPARISON:  04/08/2025    FINDINGS:  Gestation: Single intrauterine gestation.    Presentation: Cephalic    Cardiac activity: 161 bpm    Posterior placenta.  Indeterminate hypoechoic area at the posterior placenta near the basal plate measuring 4 x 3.6 x 3.1 cm.  Previously 4.4 x 3.7 x 3.4 cm.    Amniotic fluid  deepest vertical pocket 5.5 cm.                        Preliminary result by Shalom Zaidi Jr., MD (05/20/25 21:12:35)                   Impression:    1. A gravid uterus is present with a gestational sac and fetal pole identified.  2. A fetal heart rate of 161 beats per minute is noted.  3. Maximum Vertical pocket is 5.5 cm which is within normal limits.  4. " Recommend serial clinical laboratory and ultrasound follow-up as indicated.               Narrative:    START OF REPORT:  Technique: 2nd trimester transabdominal ultrasound of the pelvis was performed.    Comparison: Comparison is with study dated 2025-04-08 13:14:04.    Clinical history: ER waiting abdominal pain and vomiting during pregnancy.    Findings:  Uterus: A gravid uterus is present with a gestational sac and fetal pole identified. A stable ovoid hypoechoic structure is again seen posterior to the placenta still measuring around 4 x 3.2 x 3.6 cm (previously 4.4 x 3.7 x 3.4 cm).    Fetus:  Heart rate: A fetal heart rate of 161 beats per minute is noted.  Fetal Biometry: Maximum Vertical pocket is 5.5 cm which is within normal limits.                                      EKG:         Physical Exam  Cardiovascular:      Rate and Rhythm: Normal rate and regular rhythm.      Pulses: Normal pulses.      Heart sounds: Normal heart sounds.   Pulmonary:      Effort: Pulmonary effort is normal.      Breath sounds: Normal breath sounds.   Abdominal:      Palpations: Abdomen is soft.   Musculoskeletal:         General: Normal range of motion.   Skin:     General: Skin is warm.      Capillary Refill: Capillary refill takes less than 2 seconds.   Neurological:      General: No focal deficit present.      Mental Status: She is alert.   Psychiatric:         Mood and Affect: Mood normal.       Home Medications:   Medications Ordered Prior to Encounter[1]  Current Schedule Inpatient Medications:   doxylamine succinate  25 mg Oral Q12H    famotidine  20 mg Oral BID    labetalol  100 mg Oral Q12H    potassium chloride  20 mEq Oral BID    pyridoxine (vitamin B6)  50 mg Oral Q12H     Continuous Infusions:   lactated ringers   Intravenous Continuous 125 mL/hr at 05/22/25 0828 New Bag at 05/22/25 0828     Assessment:   Chest pain  --atypical/reproducible  Hyperemesis  gravidarum  Hypokalemia  --repleted  HHD  LVH  Leukocytosis    Plan:   Echo reviewed. EF normal. No RWMA.   Continue Labetalol 100 mg bid    OK to discharge home from [facility] standpoint        ARVIN Tolliver  Cardiology  [facility]   Physician addendum:  I have seen and examined this patient as a split-shared visit with the YOSSI d/t complicated medical management of above problems written in assessment and high acuity requiring physician expertise in medical decision-making. I performed the substantive portion of the history and exam. Above medical decision-making is also formulated by me.    Cardiovascular exam:  S1, S2  Lungs:  Clear to auscultation   Extremities:  Trace edema bilaterally    Plan:  EKG reviewed.  No ST changes.  Chest pain is reproducible with palpation.  Pain appears noncardiac.  Reviewed echo no regional wall motion abnormality.  We will sign off.    Rolly oY MD  Cardiologist         [1]   No current facility-administered medications on file prior to encounter.     Current Outpatient Medications on File Prior to Encounter   Medication Sig Dispense Refill    doxylamine succinate (UNISOM, DOXYLAMINE,) 25 mg tablet Take 1/2 tablet (12.5 mg) in the morning, 1/2 tablet (12.5 mg) in the afternoon, and 1 tablet (25mg) at bedtime. 60 tablet 3    ferrous sulfate (FEOSOL) 325 mg (65 mg iron) Tab tablet Take 1 tablet (325 mg total) by mouth every other day. 15 tablet 3    folic acid (FOLVITE) 1 MG tablet Take 1 tablet (1 mg total) by mouth every other day. 15 tablet 3    promethazine (PHENERGAN) 25 MG tablet Take 1 tablet (25 mg total) by mouth every 6 (six) hours as needed for Nausea. 30 tablet 3    prenatal vit/iron fum/folic ac (PRENATAL 1+1 ORAL) Take by mouth.      pyridoxine, vitamin B6, (B-6) 25 MG Tab Take 1 tablet (25 mg total) by mouth 3 (three) times daily. (Patient not taking: Reported on 4/24/2025) 90 tablet 3

## 2025-05-22 NOTE — NURSING
"[id number]  Discussed with patient results of cardiologist results. Cleared by them for discharge, Dr. Mendosa notified and stated "ask patient if she wants to go home or stay until I round at 5" , . Notified patient of choice, pt desires to go home at this time.   [id number] Dr. Mendosa notified of patients desire be discharged now, patient to f/u with him tomorrow 0900. And call [facility] for chest pain f/u appointment .   "

## 2025-05-22 NOTE — DISCHARGE INSTRUCTIONS
Follow up appointment with Dr. Mendosa tomorrow 0900, ask for referral to [facility] so f/u appt. can be made outpatient .   Return to OB -ED for any concerns , vag. leaking or bleeding, headache not relieved by tylenol , fevergreater than 100.3 or abd cramping .    No assistance needed

## 2025-05-28 ENCOUNTER — TELEPHONE (OUTPATIENT)
Dept: MATERNAL FETAL MEDICINE | Facility: CLINIC | Age: 30
End: 2025-05-28
Payer: MEDICAID

## 2025-05-29 ENCOUNTER — PROCEDURE VISIT (OUTPATIENT)
Dept: MATERNAL FETAL MEDICINE | Facility: CLINIC | Age: 30
End: 2025-05-29
Payer: MEDICAID

## 2025-05-29 ENCOUNTER — OFFICE VISIT (OUTPATIENT)
Dept: MATERNAL FETAL MEDICINE | Facility: CLINIC | Age: 30
End: 2025-05-29
Payer: MEDICAID

## 2025-05-29 VITALS
WEIGHT: 178.31 LBS | DIASTOLIC BLOOD PRESSURE: 82 MMHG | SYSTOLIC BLOOD PRESSURE: 130 MMHG | BODY MASS INDEX: 30.44 KG/M2 | HEIGHT: 64 IN | HEART RATE: 110 BPM

## 2025-05-29 DIAGNOSIS — O21.0 HYPEREMESIS GRAVIDARUM: ICD-10-CM

## 2025-05-29 DIAGNOSIS — Z36.89 ENCOUNTER FOR FETAL ANATOMIC SURVEY: ICD-10-CM

## 2025-05-29 DIAGNOSIS — D25.9 LEIOMYOMA OF UTERUS AFFECTING PREGNANCY IN SECOND TRIMESTER: ICD-10-CM

## 2025-05-29 DIAGNOSIS — O34.12 LEIOMYOMA OF UTERUS AFFECTING PREGNANCY IN SECOND TRIMESTER: ICD-10-CM

## 2025-05-29 DIAGNOSIS — O99.212 OBESITY AFFECTING PREGNANCY IN SECOND TRIMESTER, UNSPECIFIED OBESITY TYPE: ICD-10-CM

## 2025-05-29 DIAGNOSIS — O98.512 HERPES VIRUS INFECTION IN MOTHER DURING SECOND TRIMESTER OF PREGNANCY: Primary | ICD-10-CM

## 2025-05-29 DIAGNOSIS — O99.012 ANEMIA DURING PREGNANCY IN SECOND TRIMESTER: ICD-10-CM

## 2025-05-29 DIAGNOSIS — B00.9 HERPES VIRUS INFECTION IN MOTHER DURING SECOND TRIMESTER OF PREGNANCY: Primary | ICD-10-CM

## 2025-05-29 PROCEDURE — 76805 OB US >/= 14 WKS SNGL FETUS: CPT | Mod: S$GLB,,, | Performed by: OBSTETRICS & GYNECOLOGY

## 2025-05-29 PROCEDURE — 3079F DIAST BP 80-89 MM HG: CPT | Mod: CPTII,S$GLB,, | Performed by: OBSTETRICS & GYNECOLOGY

## 2025-05-29 PROCEDURE — 3075F SYST BP GE 130 - 139MM HG: CPT | Mod: CPTII,S$GLB,, | Performed by: OBSTETRICS & GYNECOLOGY

## 2025-05-29 PROCEDURE — 1160F RVW MEDS BY RX/DR IN RCRD: CPT | Mod: CPTII,S$GLB,, | Performed by: OBSTETRICS & GYNECOLOGY

## 2025-05-29 PROCEDURE — 1111F DSCHRG MED/CURRENT MED MERGE: CPT | Mod: CPTII,S$GLB,, | Performed by: OBSTETRICS & GYNECOLOGY

## 2025-05-29 PROCEDURE — 1159F MED LIST DOCD IN RCRD: CPT | Mod: CPTII,S$GLB,, | Performed by: OBSTETRICS & GYNECOLOGY

## 2025-05-29 PROCEDURE — 99213 OFFICE O/P EST LOW 20 MIN: CPT | Mod: TH,S$GLB,, | Performed by: OBSTETRICS & GYNECOLOGY

## 2025-05-29 PROCEDURE — 3008F BODY MASS INDEX DOCD: CPT | Mod: CPTII,S$GLB,, | Performed by: OBSTETRICS & GYNECOLOGY

## 2025-05-29 RX ORDER — PROMETHAZINE HYDROCHLORIDE 25 MG/1
SUPPOSITORY RECTAL
COMMUNITY
Start: 2025-05-21

## 2025-05-29 RX ORDER — METRONIDAZOLE 500 MG/1
500 TABLET ORAL 2 TIMES DAILY
COMMUNITY
Start: 2025-05-25

## 2025-05-29 NOTE — PROGRESS NOTES
Maternal Fetal Medicine Follow Up    Subjective:     Patient ID: [id number]    Chief Complaint: mfm followup w/us      HPI: Chinyere Osorio is a 29 y.o. female  at 20w2d gestation with Estimated Date of Delivery: 10/14/25  who is here for follow-up consultation by M.    She has had a couple hospital admissions for hyperemesis, most recently on . She is currently feeling a lot better with no more nausea vomiting She has history of uterine fibroids.  She had previous  delivery for nonreassuring fetal tracing around 36 weeks.  She had mild anemia with H/H 10.6/32.6 on 2025 and opted to wait to start treatment after nausea/vomiting has resolved.  She had elevated BMI greater than 30 at earlier OB visit.  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 She was noted to have mild range elevations of the BP during last hospital stay, consistent with mild underlying CHTN. She is supposed to be on labetalol 100 mg every 12 hours.  However, she states that she is taking it only at bedtime, as below was made to feel nauseous in the morning.  She is also on low dose aspirin twice daily for preeclampsia prophylaxis.        Interval history since last M visit: See above. She denies any leaking fluid, vaginal bleeding, contractions, decreased fetal movement. Denies headaches, visual disturbances, or epigastric pain.    Pregnancy complications include:   Problem List[1]    No changes to medical, surgical, family, social, or obstetric history.    Medications:  Current Outpatient Medications   Medication Instructions    doxylamine succinate (UNISOM, DOXYLAMINE,) 25 mg tablet Take 1/2 tablet (12.5 mg) in the morning, 1/2 tablet (12.5 mg) in the afternoon, and 1 tablet (25mg) at bedtime.    ferrous sulfate (FEOSOL) 325 mg, Oral, Every other day    folic acid (FOLVITE) 1 mg, Oral, Every other day    labetaloL (NORMODYNE) 100 mg, Oral, Every 12 hours    metroNIDAZOLE (FLAGYL) 500 mg, 2 times  "daily    prenatal vit/iron fum/folic ac (PRENATAL 1+1 ORAL) Take by mouth.    promethazine (PHENERGAN) 25 mg, Oral, Every 6 hours PRN    PROMETHEGAN 25 mg suppository SMARTSI SUPPOS Rectally Every 8 Hours PRN    pyridoxine (vitamin B6) (B-6) 25 mg, Oral, 3 times daily       Review of Systems   12 point review of systems conducted, negative except as stated in the history of present illness. See HPI for details.      Objective:     Visit Vitals  /82 (BP Location: Left arm, Patient Position: Sitting)   Pulse 110   Ht 5' 4" (1.626 m)   Wt 80.9 kg (178 lb 4.8 oz)   LMP 2024 (Exact Date)   BMI 30.61 kg/m²        Physical Exam  Vitals and nursing note reviewed.   Constitutional:       Appearance: Normal appearance.      Comments: Increased BMI   HENT:      Head: Normocephalic and atraumatic.      Nose: Nose normal. No congestion.   Cardiovascular:      Rate and Rhythm: Normal rate.   Pulmonary:      Effort: Pulmonary effort is normal.   Skin:     Findings: No rash.   Neurological:      Mental Status: She is alert and oriented to person, place, and time.   Psychiatric:         Mood and Affect: Mood normal.         Behavior: Behavior normal.         Thought Content: Thought content normal.         Judgment: Judgment normal.       Assessment/Plan:     29 y.o.  female with IUP at 20w2d    Hyperemesis gravidarum, improved.  Viable IUP with size consistent with established dating on 25    Continue vitamin B6 25 mg three times a day, in addition to Doxylamine 25mg at bed time and 12.5 mg in am and 12.5 mg in afternoon. Phenergan 25 mg every six hours prn orally. She was advised on a low-fat bland diet, and eat frequent small meals, avoid juices, and avoid fluids with the meals.  If symptoms worsen, patient was advised to go to the emergency room or labor and delivery.      Chronic hypertension  Chronic hypertension is associated with significant adverse pregnancy outcomes including  birth, fetal " growth restriction, fetal demise, placental abruption, and  delivery. Based on findings of recent CHAP Study, it is recommended to utilize 140/90 as the threshold for initiation or titration of medical therapy for chronic hypertension in pregnancy, rather than the previously recommended threshold of 160/110. For patients on blood pressure medications at the start of pregnancy, in the absence of mitigating factors or side effects, they can be maintained on their medications, rather than discontinuing them and waiting to initiate treatment for blood pressures in the severe range.    Vitals:    25 1128   BP: 130/82     With this BP, she was advised to continue low salt diet, and continue labetalol at 100 mg q.h.s..     Low dose aspirin as discussed.    We will plan to see her in 5 weeks to try to complete anatomy scan and do follow-up ultrasounds every 4 weeks after that.  Recommend fetal testing starting around 32 weeks gestation until the end of pregnancy.    Among patients with uncomplicated chronic hypertension with no additional risk factors, delivery at 38-39 weeks appears to provide optimal balance between the risk of adverse fetal and  outcomes. If no medication and normal fetal assessment, recommend delivery at 39 weeks. However, will reassess closer to EDC to determine optimal timeframe or GA for delivery, based on current evaluation at that time.     Recommend close postpartum followup with Primary OB as well as PCP for ongoing evaluation/treatment of HTN.        Leiomyoma  To fibroids seen today with largest mean 3.7 cm on 2025.  Expectant management.     Myomectomy is generally avoided in pregnancy due to concerns regarding pregnancy complications.    Fetal growth as above.  If concern for degenerating fibroids, can administer short course of NSAID (48-72hrs of Indocin) prior to 32 weeks gestation.     She was instructed to report any increased pain, cramps, vaginal discharge or  bleeding. Emphasized the importance of monitoring fetal kick count activity, and reporting immediately if decreased fetal movement occurs.    Document postpartum hemorrhage risk on admission to hospital, ensure T&S sent, and consider type and crossmatch depending on postpartum hemorrhage risk      Previous  delivery for nonreassuring fetal testing  Fetal surveillance as above.      Anemia  Iron deficiency during the first two trimesters of pregnancy is associated with a 2-fold increased risk for  delivery and a 3-fold increased risk for delivering a low-birth-weight baby.    Recommend ferrous sulfate 325 mg every other day, vitamin-C 250 mg every other day, and folic acid 1 mg every other day once n/v improved.   Recommend intermittent CBC throughout pregnancy to assess response to therapy.      HSV antibody  Reasonable to use prophylactic Valtrex to 500mg bid around 35-36 weeks and continue until delivery.        BMI greater than 30  Slight weight loss since last visit but with hyperemesis gravidarum episode as above that has improved. Weight loss in pregnancy could be associated with adverse  outcome.  Patient was advised to increase caloric intake and avoid any additional weight loss.    Reviewed importance of FKC 3/day and prn with instructions to immediately report any decreased fetal movement.    It is important to lose weight after the pregnancy is over, especially before a future pregnancy. Breastfeeding may be an important tool in reducing the postpartum weight retention. Fetal risks were discussed with short term risk of fetal/ obesity and long term risk of adolescent component of metabolic syndrome.    We recommend 1 hour GCT between 24-28 weeks' gestation.      Preeclampsia prophylaxis  With her risk factors for preeclampsia, she will continue low dose aspirin twice daily until 34 6/7weeks, then decrease to once daily until delivery after that. Preeclampsia precautions  reviewed.         No follow-ups on file.     No future appointments.       YOSSI involvement: Patient was evaluated and examined by Dr. Hidalgo. TAWANA Arnold, helped in pre charting of part of note.    This note was created with the assistance of [employer] voice recognition software. There may be transcription errors as a result of using this technology, however minimal. Effort has been made to ensure accuracy of transcription, but any obvious errors or omissions should be clarified with the author of the document.           [1]   Patient Active Problem List  Diagnosis    Hyperemesis gravidarum    Leiomyoma of uterus affecting pregnancy    Anemia during pregnancy in second trimester    Increased BMI affecting pregnancy in second trimester    Herpes virus infection in mother during second trimester of pregnancy

## 2025-06-19 DIAGNOSIS — O99.012 ANEMIA DURING PREGNANCY IN SECOND TRIMESTER: ICD-10-CM

## 2025-06-19 DIAGNOSIS — O98.512 HERPES VIRUS INFECTION IN MOTHER DURING SECOND TRIMESTER OF PREGNANCY: Primary | ICD-10-CM

## 2025-06-19 DIAGNOSIS — B00.9 HERPES VIRUS INFECTION IN MOTHER DURING SECOND TRIMESTER OF PREGNANCY: Primary | ICD-10-CM

## 2025-06-19 DIAGNOSIS — Z36.2 ENCOUNTER FOR FOLLOW-UP ULTRASOUND OF FETAL ANATOMY: ICD-10-CM

## 2025-06-30 NOTE — PROGRESS NOTES
Maternal Fetal Medicine Follow Up    Subjective:     Patient ID: [id number]    Chief Complaint: [facility] follow up with US      HPI: Chinyere Osorio is a 29 y.o. female  at 25w1d gestation with Estimated Date of Delivery: 10/14/25  who is here for follow-up consultation by M.    She has had a couple hospital admissions for hyperemesis, most recently on . She is currently feeling a lot better with no more nausea/vomiting.  She has history of uterine fibroids.  She had previous  delivery for nonreassuring fetal tracing around 36 weeks.  She had mild anemia with H/H 10.6/32.6 on 2025 and is currently on oral hematinic therapy.  She had elevated BMI greater than 30 at earlier OB visit.  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 She was noted to have mild range elevations of the BP during last hospital stay, consistent with mild underlying CHTN. She is currently  on labetalol 100 mg every day at bedtime.  She is also on low dose aspirin twice daily for preeclampsia prophylaxis. She is accompanied by her mother Amy Tran.         Interval history since last [facility] visit: none. She denies any leaking fluid, vaginal bleeding, contractions, decreased fetal movement. Denies headaches, visual disturbances, or epigastric pain.    Pregnancy complications include:   Problem List[1]    No changes to medical, surgical, family, social, or obstetric history.    Medications:  Current Outpatient Medications   Medication Instructions    doxylamine succinate (UNISOM, DOXYLAMINE,) 25 mg tablet Take 1/2 tablet (12.5 mg) in the morning, 1/2 tablet (12.5 mg) in the afternoon, and 1 tablet (25mg) at bedtime.    ferrous sulfate (FEOSOL) 325 mg, Oral, Every other day    folic acid (FOLVITE) 1 mg, Oral, Every other day    labetaloL (NORMODYNE) 100 mg, Oral, Every 12 hours    prenatal vit/iron fum/folic ac (PRENATAL 1+1 ORAL) Take by mouth.    promethazine (PHENERGAN) 25 mg, Oral, Every 6 hours PRN     "PROMETHEGAN 25 mg suppository SMARTSI SUPPOS Rectally Every 8 Hours PRN    pyridoxine (vitamin B6) (B-6) 25 mg, Oral, 3 times daily       Review of Systems   12 point review of systems conducted, negative except as stated in the history of present illness. See HPI for details.      Objective:     Visit Vitals  /86 (BP Location: Left arm, Patient Position: Sitting)   Pulse (!) 120   Ht 5' 4" (1.626 m)   Wt 79.8 kg (176 lb)   LMP 2024 (Exact Date)   BMI 30.21 kg/m²        Physical Exam  Vitals and nursing note reviewed.   Constitutional:       Appearance: Normal appearance.      Comments: Increased BMI   HENT:      Head: Normocephalic and atraumatic.      Nose: Nose normal. No congestion.   Cardiovascular:      Rate and Rhythm: Normal rate.   Pulmonary:      Effort: Pulmonary effort is normal.   Skin:     Findings: No rash.   Neurological:      Mental Status: She is alert and oriented to person, place, and time.   Psychiatric:         Mood and Affect: Mood normal.         Behavior: Behavior normal.         Thought Content: Thought content normal.         Judgment: Judgment normal.         Assessment/Plan:     29 y.o.  female with IUP at 25w1d    Hyperemesis gravidarum, improved.  There is normal fetal growth with an EFW of 807 g at the 52% and the AC at the 45% on 25.  AFV is normal.      Continue vitamin B6 25 mg three times a day, in addition to Doxylamine 25mg at bed time and 12.5 mg in am and 12.5 mg in afternoon. Phenergan 25 mg every six hours prn orally. She was advised on a low-fat bland diet, and eat frequent small meals, avoid juices, and avoid fluids with the meals.  If symptoms worsen, patient was advised to go to the emergency room or labor and delivery.      Chronic hypertension  Chronic hypertension is associated with significant adverse pregnancy outcomes including  birth, fetal growth restriction, fetal demise, placental abruption, and  delivery. Based on " findings of recent CHAP Study, it is recommended to utilize 140/90 as the threshold for initiation or titration of medical therapy for chronic hypertension in pregnancy, rather than the previously recommended threshold of 160/110. For patients on blood pressure medications at the start of pregnancy, in the absence of mitigating factors or side effects, they can be maintained on their medications, rather than discontinuing them and waiting to initiate treatment for blood pressures in the severe range.    Vitals:    25 1133   BP: 117/86       With this BP, she was advised to continue low salt diet, and continue labetalol at 100 mg q.h.s..     Low dose aspirin as discussed.    We will plan to see her for follow-up ultrasounds every 4 weeks.  Recommend fetal testing starting around 32 weeks gestation until the end of pregnancy.    Among patients with uncomplicated chronic hypertension with no additional risk factors, delivery at 38-39 weeks appears to provide optimal balance between the risk of adverse fetal and  outcomes. If no medication and normal fetal assessment, recommend delivery at 39 weeks. However, will reassess closer to EDC to determine optimal timeframe or GA for delivery, based on current evaluation at that time.     Recommend close postpartum followup with Primary OB as well as PCP for ongoing evaluation/treatment of HTN.        Leiomyoma  Two fibroids seen today with largest mean 3.4 cm on 2025.  Expectant management.     Myomectomy is generally avoided in pregnancy due to concerns regarding pregnancy complications.    Fetal growth as above.  If concern for degenerating fibroids, can administer short course of NSAID (48-72hrs of Indocin) prior to 32 weeks gestation.     She was instructed to report any increased pain, cramps, vaginal discharge or bleeding. Emphasized the importance of monitoring fetal kick count activity, and reporting immediately if decreased fetal movement  occurs.    Document postpartum hemorrhage risk on admission to hospital, ensure T&S sent, and consider type and crossmatch depending on postpartum hemorrhage risk      Previous  delivery for nonreassuring fetal testing  Fetal surveillance as above.      Anemia  Iron deficiency during the first two trimesters of pregnancy is associated with a 2-fold increased risk for  delivery and a 3-fold increased risk for delivering a low-birth-weight baby.    Continue ferrous sulfate 325 mg every other day, vitamin-C 250 mg every other day, and folic acid 1 mg every other day once n/v improved.   Recommend intermittent CBC throughout pregnancy to assess response to therapy.      HSV antibody  Reasonable to use prophylactic Valtrex to 500mg bid around 35-36 weeks and continue until delivery.        BMI greater than 30  Body mass index is 30.21 kg/m². With relatively stable weight recently, she was advised to continue healthy low caloric and low salt diet.   Weight loss in pregnancy could be associated with adverse  outcome.  Patient was advised to increase caloric intake and avoid any additional weight loss.    Reviewed importance of FKC 3/day and prn with instructions to immediately report any decreased fetal movement.    It is important to lose weight after the pregnancy is over, especially before a future pregnancy. Breastfeeding may be an important tool in reducing the postpartum weight retention. Fetal risks were discussed with short term risk of fetal/ obesity and long term risk of adolescent component of metabolic syndrome.    We recommend 1 hour GCT between 24-28 weeks' gestation.      Preeclampsia prophylaxis  With her risk factors for preeclampsia, she will continue low dose aspirin twice daily until 34 6/7weeks, then decrease to once daily until delivery after that. Preeclampsia precautions reviewed.       Patient is about 2 lb less than what it was last month.  No evidence of  dehydration.  Encouraged to increase caloric intake and avoid any further weight loss.  Her fetal growth is normal.  Fetal testing is reassuring.  Continue aspirin b.i.d..  Continue labetalol at bedtime.  Follow-up CBC was on    Follow up in about 4 weeks (around 7/30/2025) for MFM follow-up, Repeat ultrasound.     No future appointments.      SCRIBE #1 NOTE: I, Nohelia Rivera, am scribing for, and in the presence of,  Jc Hidalgo MD. I have scribed the following portions of the note - Other sections scribed: HPI and Assessment/Plan.       This note was created with the assistance of [employer] voice recognition software. There may be transcription errors as a result of using this technology, however minimal. Effort has been made to ensure accuracy of transcription, but any obvious errors or omissions should be clarified with the author of the document.           [1]   Patient Active Problem List  Diagnosis    Hyperemesis gravidarum    Leiomyoma of uterus affecting pregnancy    Anemia during pregnancy in second trimester    Increased BMI affecting pregnancy in second trimester    Herpes virus infection in mother during second trimester of pregnancy

## 2025-07-02 ENCOUNTER — OFFICE VISIT (OUTPATIENT)
Dept: MATERNAL FETAL MEDICINE | Facility: CLINIC | Age: 30
End: 2025-07-02
Payer: MEDICAID

## 2025-07-02 ENCOUNTER — LAB VISIT (OUTPATIENT)
Dept: LAB | Facility: HOSPITAL | Age: 30
End: 2025-07-02
Payer: MEDICAID

## 2025-07-02 ENCOUNTER — TELEPHONE (OUTPATIENT)
Dept: MATERNAL FETAL MEDICINE | Facility: CLINIC | Age: 30
End: 2025-07-02

## 2025-07-02 ENCOUNTER — PROCEDURE VISIT (OUTPATIENT)
Dept: MATERNAL FETAL MEDICINE | Facility: CLINIC | Age: 30
End: 2025-07-02
Payer: MEDICAID

## 2025-07-02 ENCOUNTER — RESULTS FOLLOW-UP (OUTPATIENT)
Dept: MATERNAL FETAL MEDICINE | Facility: CLINIC | Age: 30
End: 2025-07-02

## 2025-07-02 VITALS
HEIGHT: 64 IN | SYSTOLIC BLOOD PRESSURE: 117 MMHG | BODY MASS INDEX: 30.05 KG/M2 | DIASTOLIC BLOOD PRESSURE: 86 MMHG | HEART RATE: 120 BPM | WEIGHT: 176 LBS

## 2025-07-02 DIAGNOSIS — Z36.89 ENCOUNTER FOR FETAL ANATOMIC SURVEY: ICD-10-CM

## 2025-07-02 DIAGNOSIS — D25.9 LEIOMYOMA OF UTERUS AFFECTING PREGNANCY IN SECOND TRIMESTER: ICD-10-CM

## 2025-07-02 DIAGNOSIS — O99.012 ANEMIA DURING PREGNANCY IN SECOND TRIMESTER: ICD-10-CM

## 2025-07-02 DIAGNOSIS — O98.512 HERPES VIRUS INFECTION IN MOTHER DURING SECOND TRIMESTER OF PREGNANCY: ICD-10-CM

## 2025-07-02 DIAGNOSIS — Z36.2 ENCOUNTER FOR FOLLOW-UP ULTRASOUND OF FETAL ANATOMY: ICD-10-CM

## 2025-07-02 DIAGNOSIS — O99.012 ANEMIA DURING PREGNANCY IN SECOND TRIMESTER: Primary | ICD-10-CM

## 2025-07-02 DIAGNOSIS — O10.012 PRE-EXISTING ESSENTIAL HYPERTENSION COMPLICATING PREGNANCY IN SECOND TRIMESTER: ICD-10-CM

## 2025-07-02 DIAGNOSIS — O34.12 LEIOMYOMA OF UTERUS AFFECTING PREGNANCY IN SECOND TRIMESTER: ICD-10-CM

## 2025-07-02 DIAGNOSIS — O99.212 OBESITY AFFECTING PREGNANCY IN SECOND TRIMESTER, UNSPECIFIED OBESITY TYPE: ICD-10-CM

## 2025-07-02 DIAGNOSIS — B00.9 HERPES VIRUS INFECTION IN MOTHER DURING SECOND TRIMESTER OF PREGNANCY: ICD-10-CM

## 2025-07-02 LAB
ERYTHROCYTE [DISTWIDTH] IN BLOOD BY AUTOMATED COUNT: 15.9 % (ref 11.5–17)
HCT VFR BLD AUTO: 31.5 % (ref 37–47)
HGB BLD-MCNC: 9.8 G/DL (ref 12–16)
MCH RBC QN AUTO: 23.4 PG (ref 27–31)
MCHC RBC AUTO-ENTMCNC: 31.1 G/DL (ref 33–36)
MCV RBC AUTO: 75.4 FL (ref 80–94)
NRBC BLD AUTO-RTO: 0 %
PLATELET # BLD AUTO: 255 X10(3)/MCL (ref 130–400)
PMV BLD AUTO: 11.9 FL (ref 7.4–10.4)
RBC # BLD AUTO: 4.18 X10(6)/MCL (ref 4.2–5.4)
WBC # BLD AUTO: 8.35 X10(3)/MCL (ref 4.5–11.5)

## 2025-07-02 PROCEDURE — 1159F MED LIST DOCD IN RCRD: CPT | Mod: CPTII,S$GLB,, | Performed by: OBSTETRICS & GYNECOLOGY

## 2025-07-02 PROCEDURE — 3008F BODY MASS INDEX DOCD: CPT | Mod: CPTII,S$GLB,, | Performed by: OBSTETRICS & GYNECOLOGY

## 2025-07-02 PROCEDURE — 76816 OB US FOLLOW-UP PER FETUS: CPT | Mod: S$GLB,,, | Performed by: OBSTETRICS & GYNECOLOGY

## 2025-07-02 PROCEDURE — 99213 OFFICE O/P EST LOW 20 MIN: CPT | Mod: TH,S$GLB,, | Performed by: OBSTETRICS & GYNECOLOGY

## 2025-07-02 PROCEDURE — 3074F SYST BP LT 130 MM HG: CPT | Mod: CPTII,S$GLB,, | Performed by: OBSTETRICS & GYNECOLOGY

## 2025-07-02 PROCEDURE — 36415 COLL VENOUS BLD VENIPUNCTURE: CPT

## 2025-07-02 PROCEDURE — 85027 COMPLETE CBC AUTOMATED: CPT

## 2025-07-02 PROCEDURE — 1160F RVW MEDS BY RX/DR IN RCRD: CPT | Mod: CPTII,S$GLB,, | Performed by: OBSTETRICS & GYNECOLOGY

## 2025-07-02 PROCEDURE — 3079F DIAST BP 80-89 MM HG: CPT | Mod: CPTII,S$GLB,, | Performed by: OBSTETRICS & GYNECOLOGY

## 2025-07-02 NOTE — TELEPHONE ENCOUNTER
----- Message from Heidi Ngo PA-C sent at 7/2/2025  4:10 PM CDT -----  Please notify patient of below:    1. H&H was relatively stable, make sure she is taking her iron/vitamin-C/folic acid every other day.  We will follow-up in a month.  ----- Message -----  From: Lab, Background User  Sent: 7/2/2025   2:06 PM CDT  To: Heidi Ngo PA-C      Pt notified

## 2025-07-07 ENCOUNTER — HOSPITAL ENCOUNTER (EMERGENCY)
Facility: HOSPITAL | Age: 30
Discharge: HOME | End: 2025-07-08
Admitting: OBSTETRICS & GYNECOLOGY
Payer: MEDICAID

## 2025-07-07 PROBLEM — O26.892 ABDOMINAL PAIN DURING PREGNANCY, SECOND TRIMESTER: Status: ACTIVE | Noted: 2025-07-07

## 2025-07-07 PROBLEM — R10.9 ABDOMINAL PAIN DURING PREGNANCY, SECOND TRIMESTER: Status: ACTIVE | Noted: 2025-07-07

## 2025-07-07 LAB
BACTERIA #/AREA URNS AUTO: ABNORMAL /HPF
BILIRUB UR QL STRIP.AUTO: NEGATIVE
CLARITY UR: CLEAR
COLOR UR AUTO: ABNORMAL
CTP QC/QA: YES
GLUCOSE UR QL STRIP: NORMAL
HGB UR QL STRIP: NEGATIVE
KETONES UR QL STRIP: NEGATIVE
LEUKOCYTE ESTERASE UR QL STRIP: NEGATIVE
MUCOUS THREADS URNS QL MICRO: ABNORMAL /LPF
NITRITE UR QL STRIP: NEGATIVE
PH UR STRIP: 6 [PH]
PROT UR QL STRIP: NEGATIVE
RBC #/AREA URNS AUTO: ABNORMAL /HPF
RUPTURE OF MEMBRANE: NEGATIVE
SP GR UR STRIP.AUTO: 1.02 (ref 1–1.03)
SQUAMOUS #/AREA URNS LPF: ABNORMAL /HPF
UROBILINOGEN UR STRIP-ACNC: NORMAL
WBC #/AREA URNS AUTO: ABNORMAL /HPF

## 2025-07-07 PROCEDURE — 84112 EVAL AMNIOTIC FLUID PROTEIN: CPT

## 2025-07-07 PROCEDURE — 99284 EMERGENCY DEPT VISIT MOD MDM: CPT

## 2025-07-07 PROCEDURE — 81015 MICROSCOPIC EXAM OF URINE: CPT | Performed by: OBSTETRICS & GYNECOLOGY

## 2025-07-08 VITALS
SYSTOLIC BLOOD PRESSURE: 126 MMHG | HEART RATE: 103 BPM | TEMPERATURE: 99 F | OXYGEN SATURATION: 100 % | DIASTOLIC BLOOD PRESSURE: 80 MMHG | RESPIRATION RATE: 16 BRPM

## 2025-07-08 PROBLEM — Z3A.25 PREGNANCY WITH 25 COMPLETED WEEKS GESTATION: Status: ACTIVE | Noted: 2025-07-08

## 2025-07-08 PROBLEM — O34.219 HISTORY OF CESAREAN SECTION COMPLICATING PREGNANCY: Status: ACTIVE | Noted: 2025-07-08

## 2025-07-08 PROBLEM — N89.8 VAGINAL DISCHARGE DURING PREGNANCY IN SECOND TRIMESTER: Status: ACTIVE | Noted: 2025-07-07

## 2025-07-08 NOTE — DISCHARGE SUMMARY
Please follow up with all scheduled provider(s) visits. Please call your provider(s)office next business day to inquire if a sooner appointment is required. Return to [facility] with complaints of water/fluid leaking from the vagina, vaginal bleeding, decreased fetal movement,  4 or more painful contractions in an hour, or a persistent headache that does not resolve with normal measures (rest, tylenol, hydration). Perform kick counts when needed and drink 8-12 cups(2.5 full 40 oz modesto bottles) of water a day.

## 2025-07-08 NOTE — HOSPITAL COURSE
No evidence of PTL.  UA WNL.  Maternal and fetal status reassuring.  Pt believes she has appt with primary OB this week.

## 2025-07-08 NOTE — ED PROVIDER NOTES
[facility] - Emergency Dept (OB)  Obstetrics  History & Physical    Patient Name: Chinyere Osorio  MRN: [id number]  Admission Date: 2025  Primary Care Provider: Jazmine, Primary Doctor    Subjective:     Principal Problem:abd pain    History of Present Illness: 29 yro  @ 25 wk 6 d presenting with abd pain.  Prog complicated by prior Csec.  Pt denies any N/V or constipation. Pt is also concerned she may be leaking.    Obstetric HPI:  Patient reports None contractions, active fetal movement, No vaginal bleeding , possible loss of fluid     This pregnancy has been complicated by history of prior Csec.    OB History    Para Term  AB Living   2 1 0 1 0 1   SAB IAB Ectopic Multiple Live Births   0 0 0 0 1      # Outcome Date GA Lbr [place]/2nd Weight Sex Type Anes PTL Lv   2 Current            1  18 36w2d  2.58 kg (5 lb 11 oz) M CS-LTranv EPI Y CORNEL      Complications: Bradycardic baseline fetal heart rate     Past Medical History:   Diagnosis Date    Anxiety     Anxiety disorder     Headache     Herpes simplex virus (HSV) infection     HSV 1/ Pt has never had a breakout     Past Surgical History:   Procedure Laterality Date     SECTION  2018    DILATION AND CURETTAGE OF UTERUS      Cyst Rupture    WISDOM TOOTH EXTRACTION      Two bottom teeth were removed       (Not in a hospital admission)      Review of patient's allergies indicates:   Allergen Reactions    Ondansetron hcl Hives and Other (See Comments)        Family History       Problem Relation (Age of Onset)    Cancer Maternal Aunt    Eclampsia Mother    Fibroids Mother, Sister    Hypertension Mother, Sister    Ovarian cysts Mother, Sister    Stomach cancer Paternal Grandmother          Tobacco Use    Smoking status: Never     Passive exposure: Never    Smokeless tobacco: Never   Substance and Sexual Activity    Alcohol use: Never     Comment: social    Drug use: Never    Sexual activity: Not  Currently     Partners: Male     Review of Systems   Constitutional: Negative.    Respiratory: Negative.     Cardiovascular: Negative.    Gastrointestinal: Negative.    Endocrine: Negative.    Genitourinary: Negative.    Musculoskeletal: Negative.    Hematological: Negative.    Psychiatric/Behavioral: Negative.     Breast: negative.       Objective:     Vital Signs (Most Recent):  Temp: 98.5 °F (36.9 °C) (07/07/25 2236)  Pulse: 103 (07/08/25 0000)  Resp: 16 (07/07/25 2236)  BP: 126/80 (07/07/25 2236)  SpO2: 100 % (07/07/25 2359) Vital Signs (24h Range):  Temp:  [98.5 °F (36.9 °C)] 98.5 °F (36.9 °C)  Pulse:  [] 103  Resp:  [16] 16  SpO2:  [99 %-100 %] 100 %  BP: (126)/(80) 126/80        There is no height or weight on file to calculate BMI.    FHT: 140, reactive Cat 1 (reassuring)  TOCO:  none    Physical Exam:   Constitutional: She is oriented to person, place, and time. She appears well-developed and well-nourished.    HENT:   Head: Normocephalic and atraumatic.     Neck: No thyromegaly present.     Pulmonary/Chest: Effort normal. No respiratory distress.        Abdominal: Soft. She exhibits no distension and no mass. There is no abdominal tenderness. There is no rebound and no guarding.             Musculoskeletal: Normal range of motion and moves all extremeties. No tenderness.       Neurological: She is alert and oriented to person, place, and time. No cranial nerve deficit. Coordination normal.    Skin: She is not diaphoretic.    Psychiatric: She has a normal mood and affect. Her behavior is normal. Judgment and thought content normal.       Cervix:  Dilation:  0  Effacement:  50%  Station: -3  Presentation: unknown     Significant Labs:  Lab Results   Component Value Date    GROUPTRH O POS 04/08/2025    HEPBSAG Nonreactive 04/08/2025       Recent Labs   Lab 07/07/25  2341   COLORU Light-Yellow   PHUR 6.0   PROTEINUA Negative   BACTERIA Occasional*   NITRITE Negative   LEUKOCYTESUR Negative   UROBILINOGEN  Normal     I have personallly reviewed all pertinent lab results from the last 24 hours.    Assessment/Plan:     29 y.o. female  at 25w6d for:    Active Diagnoses:    Diagnosis Date Noted POA    PRINCIPAL PROBLEM:  Abdominal pain during pregnancy, second trimester [O26.892, R10.9] 2025 Yes    History of  section complicating pregnancy [O34.219] 2025 Yes    Pregnancy with 25 completed weeks gestation [Z3A.25] 2025 Not Applicable      Problems Resolved During this Admission:       No evidence of PTL.  UA WNL.  Maternal and fetal status reassuring.  Pt believes she has appt with primary OB this week.    Angel Briggs MD  Obstetrics  [facility] - Emergency Dept (OB)

## 2025-07-14 DIAGNOSIS — O34.12 LEIOMYOMA OF UTERUS AFFECTING PREGNANCY IN SECOND TRIMESTER: ICD-10-CM

## 2025-07-14 DIAGNOSIS — O99.212 OBESITY AFFECTING PREGNANCY IN SECOND TRIMESTER, UNSPECIFIED OBESITY TYPE: ICD-10-CM

## 2025-07-14 DIAGNOSIS — D25.9 LEIOMYOMA OF UTERUS AFFECTING PREGNANCY IN SECOND TRIMESTER: ICD-10-CM

## 2025-07-14 DIAGNOSIS — O99.012 ANEMIA DURING PREGNANCY IN SECOND TRIMESTER: Primary | ICD-10-CM

## 2025-07-14 DIAGNOSIS — Z36.89 ENCOUNTER FOR ULTRASOUND TO ASSESS FETAL GROWTH: ICD-10-CM

## 2025-07-24 ENCOUNTER — HOSPITAL ENCOUNTER (EMERGENCY)
Facility: HOSPITAL | Age: 30
Discharge: HOME | End: 2025-07-24
Admitting: OBSTETRICS & GYNECOLOGY
Payer: MEDICAID

## 2025-07-24 VITALS
SYSTOLIC BLOOD PRESSURE: 124 MMHG | HEART RATE: 99 BPM | TEMPERATURE: 98 F | HEIGHT: 64 IN | OXYGEN SATURATION: 100 % | BODY MASS INDEX: 30.56 KG/M2 | DIASTOLIC BLOOD PRESSURE: 76 MMHG | RESPIRATION RATE: 20 BRPM | WEIGHT: 179 LBS

## 2025-07-24 DIAGNOSIS — R10.9 ABDOMINAL PAIN DURING PREGNANCY IN THIRD TRIMESTER: Primary | ICD-10-CM

## 2025-07-24 DIAGNOSIS — R11.2: ICD-10-CM

## 2025-07-24 DIAGNOSIS — O36.8130 DECREASED FETAL MOVEMENTS IN THIRD TRIMESTER, SINGLE OR UNSPECIFIED FETUS: ICD-10-CM

## 2025-07-24 DIAGNOSIS — O26.893 ABDOMINAL PAIN DURING PREGNANCY IN THIRD TRIMESTER: Primary | ICD-10-CM

## 2025-07-24 DIAGNOSIS — O26.899 ABDOMINAL PAIN AFFECTING PREGNANCY: ICD-10-CM

## 2025-07-24 DIAGNOSIS — Z3A.28 28 WEEKS GESTATION OF PREGNANCY: ICD-10-CM

## 2025-07-24 DIAGNOSIS — R10.9 ABDOMINAL PAIN AFFECTING PREGNANCY: ICD-10-CM

## 2025-07-24 LAB
ALBUMIN SERPL-MCNC: 2.7 G/DL (ref 3.5–5)
ALBUMIN/GLOB SERPL: 0.7 RATIO (ref 1.1–2)
ALP SERPL-CCNC: 84 UNIT/L (ref 40–150)
ALT SERPL-CCNC: 9 UNIT/L (ref 0–55)
AMYLASE SERPL-CCNC: 45 UNIT/L (ref 25–125)
ANION GAP SERPL CALC-SCNC: 10 MEQ/L
AST SERPL-CCNC: 10 UNIT/L (ref 11–45)
BACTERIA #/AREA URNS AUTO: ABNORMAL /HPF
BASOPHILS # BLD AUTO: 0.02 X10(3)/MCL
BASOPHILS NFR BLD AUTO: 0.2 %
BILIRUB SERPL-MCNC: 1 MG/DL
BILIRUB UR QL STRIP.AUTO: NEGATIVE
BUN SERPL-MCNC: 4.2 MG/DL (ref 7–18.7)
CALCIUM SERPL-MCNC: 8.8 MG/DL (ref 8.4–10.2)
CHLORIDE SERPL-SCNC: 103 MMOL/L (ref 98–107)
CLARITY UR: CLEAR
CO2 SERPL-SCNC: 22 MMOL/L (ref 22–29)
COLOR UR AUTO: YELLOW
CREAT SERPL-MCNC: 0.55 MG/DL (ref 0.55–1.02)
CREAT UR-MCNC: 173.8 MG/DL (ref 45–106)
CREAT/UREA NIT SERPL: 8
EOSINOPHIL # BLD AUTO: 0.02 X10(3)/MCL (ref 0–0.9)
EOSINOPHIL NFR BLD AUTO: 0.2 %
ERYTHROCYTE [DISTWIDTH] IN BLOOD BY AUTOMATED COUNT: 15.4 % (ref 11.5–17)
GFR SERPLBLD CREATININE-BSD FMLA CKD-EPI: >60 ML/MIN/1.73/M2
GLOBULIN SER-MCNC: 4.1 GM/DL (ref 2.4–3.5)
GLUCOSE SERPL-MCNC: 85 MG/DL (ref 74–100)
GLUCOSE UR QL STRIP: NORMAL
HCT VFR BLD AUTO: 30.7 % (ref 37–47)
HGB BLD-MCNC: 9.7 G/DL (ref 12–16)
HGB UR QL STRIP: NEGATIVE
IMM GRANULOCYTES # BLD AUTO: 0.06 X10(3)/MCL (ref 0–0.04)
IMM GRANULOCYTES NFR BLD AUTO: 0.6 %
KETONES UR QL STRIP: ABNORMAL
LDH SERPL-CCNC: 178 U/L (ref 125–220)
LEUKOCYTE ESTERASE UR QL STRIP: NEGATIVE
LIPASE SERPL-CCNC: 13 U/L
LYMPHOCYTES # BLD AUTO: 1.84 X10(3)/MCL (ref 0.6–4.6)
LYMPHOCYTES NFR BLD AUTO: 19.3 %
MCH RBC QN AUTO: 23.2 PG (ref 27–31)
MCHC RBC AUTO-ENTMCNC: 31.6 G/DL (ref 33–36)
MCV RBC AUTO: 73.4 FL (ref 80–94)
MONOCYTES # BLD AUTO: 0.75 X10(3)/MCL (ref 0.1–1.3)
MONOCYTES NFR BLD AUTO: 7.9 %
MUCOUS THREADS URNS QL MICRO: ABNORMAL /LPF
NEUTROPHILS # BLD AUTO: 6.83 X10(3)/MCL (ref 2.1–9.2)
NEUTROPHILS NFR BLD AUTO: 71.8 %
NITRITE UR QL STRIP: NEGATIVE
NRBC BLD AUTO-RTO: 0 %
PH UR STRIP: 6 [PH]
PLATELET # BLD AUTO: 236 X10(3)/MCL (ref 130–400)
PMV BLD AUTO: 10.9 FL (ref 7.4–10.4)
POTASSIUM SERPL-SCNC: 3.6 MMOL/L (ref 3.5–5.1)
PROT SERPL-MCNC: 6.8 GM/DL (ref 6.4–8.3)
PROT UR QL STRIP: ABNORMAL
PROT UR STRIP-MCNC: 15.6 MG/DL
RBC # BLD AUTO: 4.18 X10(6)/MCL (ref 4.2–5.4)
RBC #/AREA URNS AUTO: ABNORMAL /HPF
SODIUM SERPL-SCNC: 135 MMOL/L (ref 136–145)
SP GR UR STRIP.AUTO: 1.02 (ref 1–1.03)
SQUAMOUS #/AREA URNS LPF: ABNORMAL /HPF
URATE SERPL-MCNC: 3.9 MG/DL (ref 2.6–6)
URINE PROTEIN/CREATININE RATIO (OLG): 0.1
UROBILINOGEN UR STRIP-ACNC: NORMAL
WBC # BLD AUTO: 9.52 X10(3)/MCL (ref 4.5–11.5)
WBC #/AREA URNS AUTO: ABNORMAL /HPF

## 2025-07-24 PROCEDURE — 96374 THER/PROPH/DIAG INJ IV PUSH: CPT

## 2025-07-24 PROCEDURE — 83615 LACTATE (LD) (LDH) ENZYME: CPT | Performed by: OBSTETRICS & GYNECOLOGY

## 2025-07-24 PROCEDURE — 84156 ASSAY OF PROTEIN URINE: CPT | Performed by: OBSTETRICS & GYNECOLOGY

## 2025-07-24 PROCEDURE — 84550 ASSAY OF BLOOD/URIC ACID: CPT | Performed by: OBSTETRICS & GYNECOLOGY

## 2025-07-24 PROCEDURE — 85025 COMPLETE CBC W/AUTO DIFF WBC: CPT | Performed by: OBSTETRICS & GYNECOLOGY

## 2025-07-24 PROCEDURE — 63600175 PHARM REV CODE 636 W HCPCS: Performed by: OBSTETRICS & GYNECOLOGY

## 2025-07-24 PROCEDURE — 87086 URINE CULTURE/COLONY COUNT: CPT | Performed by: OBSTETRICS & GYNECOLOGY

## 2025-07-24 PROCEDURE — 80053 COMPREHEN METABOLIC PANEL: CPT | Performed by: OBSTETRICS & GYNECOLOGY

## 2025-07-24 PROCEDURE — 81001 URINALYSIS AUTO W/SCOPE: CPT | Performed by: OBSTETRICS & GYNECOLOGY

## 2025-07-24 PROCEDURE — 99285 EMERGENCY DEPT VISIT HI MDM: CPT | Mod: 25

## 2025-07-24 PROCEDURE — 83690 ASSAY OF LIPASE: CPT | Performed by: OBSTETRICS & GYNECOLOGY

## 2025-07-24 PROCEDURE — 82150 ASSAY OF AMYLASE: CPT | Performed by: OBSTETRICS & GYNECOLOGY

## 2025-07-24 PROCEDURE — 59025 FETAL NON-STRESS TEST: CPT | Mod: 59

## 2025-07-24 RX ORDER — PROCHLORPERAZINE EDISYLATE 5 MG/ML
5 INJECTION INTRAMUSCULAR; INTRAVENOUS EVERY 6 HOURS PRN
Status: DISCONTINUED | OUTPATIENT
Start: 2025-07-24 | End: 2025-07-24 | Stop reason: HOSPADM

## 2025-07-24 RX ADMIN — PROCHLORPERAZINE EDISYLATE 5 MG: 5 INJECTION INTRAMUSCULAR; INTRAVENOUS at 01:07

## 2025-07-24 RX ADMIN — SODIUM CHLORIDE, POTASSIUM CHLORIDE, SODIUM LACTATE AND CALCIUM CHLORIDE 1000 ML: 600; 310; 30; 20 INJECTION, SOLUTION INTRAVENOUS at 01:07

## 2025-07-24 NOTE — PROGRESS NOTES
Maternal Fetal Medicine Follow Up    Subjective:     Patient ID: [id number]    Chief Complaint: m followup w/us      HPI: Chinyere Osorio is a 29 y.o. female  at 29w1d gestation with Estimated Date of Delivery: 10/14/25  who is here for follow-up consultation by M.    She has had a couple hospital admissions for hyperemesis, most recently on . She is currently feeling a lot better with no more nausea/vomiting.  She has history of uterine fibroids.  She had previous  delivery for nonreassuring fetal tracing around 36 weeks.  She had mild anemia with H/H 10.6/32.6 on 2025, follow up H/H was 9.8/31.5 on 25, and is currently on oral hematinic therapy.  She had elevated BMI greater than 30 at earlier OB visit.  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 She was noted to have mild range elevations of the BP during last hospital stay, consistent with mild underlying CHTN. She is currently on labetalol 100 mg every day at bedtime.  She is also on low dose aspirin twice daily for preeclampsia prophylaxis.    She reports vaginal discharge and intermittent headaches during this visit. She presented to the hospital reporting leaking fluid. She reports tests were negative and she was discharged in stable condition.       Interval history since last [facility] visit: none. She denies any leaking fluid, vaginal bleeding, contractions, decreased fetal movement. Denies headaches, visual disturbances, or epigastric pain.    Pregnancy complications include:   Problem List[1]    No changes to medical, surgical, family, social, or obstetric history.    Medications:  Current Outpatient Medications   Medication Instructions    acetaminophen (TYLENOL) 500 mg, Every 6 hours PRN    aspirin (ECOTRIN) 81 mg, Daily    doxylamine succinate (UNISOM, DOXYLAMINE,) 25 mg tablet Take 1/2 tablet (12.5 mg) in the morning, 1/2 tablet (12.5 mg) in the afternoon, and 1 tablet (25mg) at bedtime.    ferrous  "sulfate (FEOSOL) 325 mg, Oral, Every other day    folic acid (FOLVITE) 1 mg, Oral, Every other day    labetaloL (NORMODYNE) 100 mg    prenatal vit/iron fum/folic ac (PRENATAL 1+1 ORAL) Take by mouth.    promethazine (PHENERGAN) 25 mg, Oral, Every 6 hours PRN    PROMETHEGAN 25 mg suppository SMARTSI SUPPOS Rectally Every 8 Hours PRN    pyridoxine (vitamin B6) (B-6) 25 mg, Oral, 3 times daily       Review of Systems   12 point review of systems conducted, negative except as stated in the history of present illness. See HPI for details.      Objective:     Visit Vitals  /89 (BP Location: Left arm, Patient Position: Sitting)   Pulse 110   Ht 5' 4" (1.626 m)   Wt 79.7 kg (175 lb 12.8 oz)   LMP 2024 (Exact Date)   BMI 30.18 kg/m²        Physical Exam  Vitals and nursing note reviewed.   Constitutional:       Appearance: Normal appearance.      Comments: Increased BMI   HENT:      Head: Normocephalic and atraumatic.      Nose: Nose normal. No congestion.   Cardiovascular:      Rate and Rhythm: Normal rate.   Pulmonary:      Effort: Pulmonary effort is normal.   Skin:     Findings: No rash.   Neurological:      Mental Status: She is alert and oriented to person, place, and time.   Psychiatric:         Mood and Affect: Mood normal.         Behavior: Behavior normal.         Thought Content: Thought content normal.         Judgment: Judgment normal.         Assessment/Plan:     29 y.o.  female with IUP at 29w1d    Chronic hypertension    Normal fetal growth with the EFW 1525 g at 55%, AC at 77% on 25.  AFV is normal.      Chronic hypertension is associated with significant adverse pregnancy outcomes including  birth, fetal growth restriction, fetal demise, placental abruption, and  delivery. Based on findings of recent CHAP Study, it is recommended to utilize 140/90 as the threshold for initiation or titration of medical therapy for chronic hypertension in pregnancy, rather than the " previously recommended threshold of 160/110. For patients on blood pressure medications at the start of pregnancy, in the absence of mitigating factors or side effects, they can be maintained on their medications, rather than discontinuing them and waiting to initiate treatment for blood pressures in the severe range.    Vitals:    25 1030   BP: 126/89         With this BP, she was advised to continue low salt diet, and continue labetalol at 100 mg q.h.s..     Low dose aspirin as discussed.    We will plan to see her for follow-up ultrasounds every 4 weeks.  Recommend fetal testing starting around 32 weeks gestation until the end of pregnancy.    Among patients with uncomplicated chronic hypertension with no additional risk factors, delivery at 38-39 weeks appears to provide optimal balance between the risk of adverse fetal and  outcomes. If no medication and normal fetal assessment, recommend delivery at 39 weeks. However, will reassess closer to EDC to determine optimal timeframe or GA for delivery, based on current evaluation at that time.     Recommend close postpartum followup with Primary OB as well as PCP for ongoing evaluation/treatment of HTN.        Leiomyoma  2 fibroids seen today with largest mean 3.3 cm on 2025.  Expectant management.     Myomectomy is generally avoided in pregnancy due to concerns regarding pregnancy complications.    Fetal growth as above.  If concern for degenerating fibroids, can administer short course of NSAID (48-72hrs of Indocin) prior to 32 weeks gestation.     She was instructed to report any increased pain, cramps, vaginal discharge or bleeding. Emphasized the importance of monitoring fetal kick count activity, and reporting immediately if decreased fetal movement occurs.    Document postpartum hemorrhage risk on admission to hospital, ensure T&S sent, and consider type and crossmatch depending on postpartum hemorrhage risk      Previous  delivery  for nonreassuring fetal testing  Fetal surveillance as above.      Anemia  Iron deficiency during the first two trimesters of pregnancy is associated with a 2-fold increased risk for  delivery and a 3-fold increased risk for delivering a low-birth-weight baby.    Continue ferrous sulfate 325 mg every other day, vitamin-C 250 mg every other day, and folic acid 1 mg every other day once n/v improved.   Recommend intermittent CBC throughout pregnancy to assess response to therapy.      HSV antibody  Reasonable to use prophylactic Valtrex to 500mg bid around 35-36 weeks and continue until delivery.      BMI greater than 30  Body mass index is 30.18 kg/m². With stable weight since last visit, but about 9 lb weight loss since 15 weeks.  she was advised to follow a healthy low-salt diet but increase caloric intake to avoid any further weight loss..   Weight loss in pregnancy could be associated with adverse  outcome.  Patient was advised to increase caloric intake and avoid any additional weight loss.    Reviewed importance of FKC 3/day and prn with instructions to immediately report any decreased fetal movement.    It is important to lose weight after the pregnancy is over, especially before a future pregnancy. Breastfeeding may be an important tool in reducing the postpartum weight retention. Fetal risks were discussed with short term risk of fetal/ obesity and long term risk of adolescent component of metabolic syndrome.      Preeclampsia prophylaxis  With her risk factors for preeclampsia, she will continue low dose aspirin twice daily until 34 6/7weeks, then decrease to once daily until delivery after that. Preeclampsia precautions reviewed.     Hyperemesis gravidarum, improved.      Continue vitamin B6 25 mg three times a day, in addition to Doxylamine 25mg at bed time and 12.5 mg in am and 12.5 mg in afternoon. Phenergan 25 mg every six hours prn orally. She was advised on a low-fat bland  diet, and eat frequent small meals, avoid juices, and avoid fluids with the meals.  If symptoms worsen, patient was advised to go to the emergency room or labor and delivery.    Patient is taking a low-dose labetalol 100 mg at bedtime.  She will continue taking aspirin b.i.d..  Importance of increasing caloric intake and avoiding further weight loss readdressed.    Follow up in about 3 weeks (around 2025) for MFM Follow-Up, BPP; 4 weeks [facility] follow-up repeat ultrasound BPP.     Future Appointments   Date Time Provider Department Center   2025  2:15 PM ROOM 3, [facility] [place] [facility]   2025  2:30 PM [facility]idal[facility]   2025  2:30 PM ROOM 2, [facility] [place] [facility]   2025  3:00 PM Jc Hidalgo MD [facility]           SCRIBE #1 NOTE: I, Nohelia Rivera, am scribing for, and in the presence of,  Jc Hidalgo MD. I have scribed the following portions of the note - Other sections scribed: HPI and Assessment/Plan.       This note was created with the assistance of [employer] voice recognition software. There may be transcription errors as a result of using this technology, however minimal. Effort has been made to ensure accuracy of transcription, but any obvious errors or omissions should be clarified with the author of the document.             [1]   Patient Active Problem List  Diagnosis    Hyperemesis gravidarum    Leiomyoma of uterus affecting pregnancy    Anemia during pregnancy in third trimester    BMI over 30 affecting pregnancy in third trimester    Herpes virus infection in mother during third trimester of pregnancy    Chronic hypertension during pregnancy, antepartum    History of  section complicating pregnancy

## 2025-07-24 NOTE — ED PROVIDER NOTES
[facility] NOTE     Admit Date: 2025  [facility] Physician: Suresh Moise  Primary OBGYN:Dr. Morro Mendosa    Admit Diagnosis/Chief Complaint: Abdominal Pain and Nausea and Vomiting      Chief Complaint   Patient presents with    Headache     Steady gait to triage c/o headache and vomiting x 3 days. HX of HTN. Currently 28 weeks pregnant.     Nausea    Vomiting    Abdominal Pain    visual changes     IUP 28.2w c/o HA, visual changes, pt states seeing black spots as flurries, N/V x3 days, right epigastric pain x3 days    Decreased Fetal Movement       HPI:  Chinyere Osorio is a 29 y.o.  at 28w2d presents complaining of right upper quadrant abdominal pain associated with nausea and vomiting for the last 3 days.  She states the pain goes straight through to the middle of her back.  Denies chest pain or shortness of breath.  She has had headaches but there is a benign able to eat for 3 days.  She last used suppository Phenergan last night.  She is still very nauseated today upon admission to triage.  She is worried about having a history of preeclampsia.  Denies visual disturbances.  Denies contractions vaginal bleeding or loss of fluid.  She states that there has been decreased fetal movement.      Patient states  history of preeclampsia in previous pregnancies, but no complications in this pregnancy.     Past medical Hx:   Past Medical History:   Diagnosis Date    Anxiety     Anxiety disorder 2019    Headache     Herpes simplex virus (HSV) infection     HSV 1/ Pt has never had a breakout       Past surgical Hx:   Past Surgical History:   Procedure Laterality Date     SECTION  2018    DILATION AND CURETTAGE OF UTERUS      Cyst Rupture    WISDOM TOOTH EXTRACTION      Two bottom teeth were removed       Allergies:   Review of patient's allergies indicates:   Allergen Reactions    Ondansetron hcl Hives and Other (See Comments)       Current medications: Current Medications[1]    Social  "history: Social History[2]    Family history:   Family History   Problem Relation Name Age of Onset    Stomach cancer Paternal Grandmother      Eclampsia Mother      Hypertension Mother      Ovarian cysts Mother      Fibroids Mother      Hypertension Sister      Fibroids Sister      Ovarian cysts Sister      Cancer Maternal Aunt         Obstetrical history:   OB History    Para Term  AB Living   2 1 0 1 0 1   SAB IAB Ectopic Multiple Live Births   0 0 0 0 1      # Outcome Date GA Lbr [place]/2nd Weight Sex Type Anes PTL Lv   2 Current            1  18 36w2d  2.58 kg (5 lb 11 oz) M CS-LTranv EPI Y CORNEL      Complications: Bradycardic baseline fetal heart rate       Medications Ordered Prior to Encounter[3]     Patient denies vaginal bleeding, leakage of fluid, contractions, vision changes, dysuria, and fever.  Fetal Movement: decreased.    /76   Pulse 99   Temp 98 °F (36.7 °C) (Oral)   Resp 20   Ht 5' 4" (1.626 m)   Wt 81.2 kg (179 lb)   LMP 2024 (Exact Date)   SpO2 100%   BMI 30.73 kg/m²   Temp:  [98 °F (36.7 °C)] 98 °F (36.7 °C)  Pulse:  [] 99  Resp:  [20] 20  SpO2:  [99 %-100 %] 100 %  BP: (119-132)/(76-90) 124/76    General: in no apparent distress in no respiratory distress and acyanotic  Cardiovascular: Regular rate and rhythm  Lungs: Clear to auscultation bilaterally  Abdominal: fundus soft, nontender consistent with 28 weeks size FHT present , tenderness to deep palpation in the right upper quadrant underneath the ribcage otherwise abdominal exam is benign.  No masses palpated  Back: lumbar tenderness absent   CVA tenderness none  Extremeties no redness or tenderness in the calves or thighs no edema Reji's sign negative bilaterally and DTRs +1/4 at the knees bilaterally and no evidence of ankle clonus    SSE:  Deferred  SVE:  Deferred    NST:  FHT:  130's, Reactive, and Reassuring for gestational age  TOCO: Contractions none  Biophysical profile:   with an " TIAN of 14 cm    Prenatal labs:  O and Rh positive    LABS:     Recent Results (from the past 24 hours)   Urinalysis    Collection Time: 07/24/25 12:51 PM   Result Value Ref Range    Color, UA Yellow Yellow, Light-Yellow, Colorless, Straw, Dark-Yellow    Appearance, UA Clear Clear    Specific Gravity, UA 1.017 1.005 - 1.030    pH, UA 6.0 5.0 - 8.5    Protein, UA Trace (A) Negative    Glucose, UA Normal Negative, Normal    Ketones, UA 2+ (A) Negative    Blood, UA Negative Negative    Bilirubin, UA Negative Negative    Urobilinogen, UA Normal 0.2, 1.0, Normal    Nitrites, UA Negative Negative    Leukocyte Esterase, UA Negative Negative    RBC, UA 0-5 None Seen, 0-2, 3-5, 0-5 /HPF    WBC, UA 0-5 None Seen, 0-2, 3-5, 0-5 /HPF    Bacteria, UA Occasional (A) None Seen, Trace /HPF    Squamous Epithelial Cells, UA Trace None Seen, Trace /HPF    Mucous, UA Occasional (A) None Seen /LPF   Protein/Creatinine Ratio, Urine    Collection Time: 07/24/25 12:59 PM   Result Value Ref Range    Urine Protein Level 15.6 mg/dL    Urine Creatinine 173.8 (H) 45.0 - 106.0 mg/dL    Urine Protein/Creatinine Ratio 0.1    Comprehensive Metabolic Panel    Collection Time: 07/24/25  1:12 PM   Result Value Ref Range    Sodium 135 (L) 136 - 145 mmol/L    Potassium 3.6 3.5 - 5.1 mmol/L    Chloride 103 98 - 107 mmol/L    CO2 22 22 - 29 mmol/L    Glucose 85 74 - 100 mg/dL    Blood Urea Nitrogen 4.2 (L) 7.0 - 18.7 mg/dL    Creatinine 0.55 0.55 - 1.02 mg/dL    Calcium 8.8 8.4 - 10.2 mg/dL    Protein Total 6.8 6.4 - 8.3 gm/dL    Albumin 2.7 (L) 3.5 - 5.0 g/dL    Globulin 4.1 (H) 2.4 - 3.5 gm/dL    Albumin/Globulin Ratio 0.7 (L) 1.1 - 2.0 ratio    Bilirubin Total 1.0 <=1.5 mg/dL    ALP 84 40 - 150 unit/L    ALT 9 0 - 55 unit/L    AST 10 (L) 11 - 45 unit/L    eGFR >60 mL/min/1.73/m2    Anion Gap 10.0 mEq/L    BUN/Creatinine Ratio 8    Uric acid    Collection Time: 07/24/25  1:12 PM   Result Value Ref Range    Uric Acid 3.9 2.6 - 6.0 mg/dL   Lactate  Dehydrogenase    Collection Time: 25  1:12 PM   Result Value Ref Range    Lactate Dehydrogenase 178 125 - 220 U/L   Amylase    Collection Time: 25  1:12 PM   Result Value Ref Range    Amylase Level 45 25 - 125 unit/L   Lipase    Collection Time: 25  1:12 PM   Result Value Ref Range    Lipase Level 13 <=60 U/L   CBC with Differential    Collection Time: 25  1:12 PM   Result Value Ref Range    WBC 9.52 4.50 - 11.50 x10(3)/mcL    RBC 4.18 (L) 4.20 - 5.40 x10(6)/mcL    Hgb 9.7 (L) 12.0 - 16.0 g/dL    Hct 30.7 (L) 37.0 - 47.0 %    MCV 73.4 (L) 80.0 - 94.0 fL    MCH 23.2 (L) 27.0 - 31.0 pg    MCHC 31.6 (L) 33.0 - 36.0 g/dL    RDW 15.4 11.5 - 17.0 %    Platelet 236 130 - 400 x10(3)/mcL    MPV 10.9 (H) 7.4 - 10.4 fL    Neut % 71.8 %    Lymph % 19.3 %    Mono % 7.9 %    Eos % 0.2 %    Basophil % 0.2 %    Imm Grans % 0.6 %    Neut # 6.83 2.1 - 9.2 x10(3)/mcL    Lymph # 1.84 0.6 - 4.6 x10(3)/mcL    Mono # 0.75 0.1 - 1.3 x10(3)/mcL    Eos # 0.02 0 - 0.9 x10(3)/mcL    Baso # 0.02 <=0.2 x10(3)/mcL    Imm Gran # 0.06 (H) 0.00 - 0.04 x10(3)/mcL    NRBC% 0.0 %         Imaging Results              US OB 14+ Weeks TransAbd, w/Biophysical Profile, w/o NST, Single Gestation (xpd) (In process)    BPP of 8/8 with an TIAN of 14                  US Abdomen Complete (In process)  Result time 25 14:38:35   Quick read notes that there was no stones in the gallbladder.  Awaiting final radiologist read.                  ASSESMENT: Chinyere Osorio is a 29 y.o.   at 28w2d with right upper quadrant abdominal pain in the 3rd trimester associated with 3 days of nausea and vomiting suspicious for gallbladder dysfunction.  Observation in [facility] with reassuring fetal heart tone tracing and no evidence of contractions  BPP 8/8 with a normal TIAN    PLAN:  IV fluids, CBC CMP amylase and lipase, right upper quadrant ultrasound to rule out gallbladder disease and IV Compazine for the nausea and vomiting.        Discharge  Diagnosis/Clinical Impression:  :  Abdominal pain during pregnancy in third trimester (Primary)  28 weeks gestation of pregnancy  Non-pregnancy nausea and vomiting  Decreased fetal movements in third trimester, single or unspecified fetus    Status:Stable    Labor precautions reviewed with patient  ER precautions reviewed with patient  Patient was given an opportunity to ask questions      Patient Instructions:       - Pt was given routine pregnancy instructions including to return to triage if she had any vaginal bleeding (other than spotting for the next 48hrs), any loss of fluid like her water broke, decreased fetal movement, or contractions Q 5min lasting for 2 or more hours. Pt was also instructed to drink copious water. Patient voiced understanding of all these instructions and was subsequently discharged home.  Please see specific patient discharge instructions for her current diagnosis.    She will follow up with her primary OB at her next scheduled OB appointment.      This note was created with the assistance of [employer] voice recognition software. There may be transcription errors as a result of using this technology however minimal. Effort has been made to assure accuracy of transcription but any obvious errors or omissions should be clarified with the author of the document.          [1]   Current Facility-Administered Medications:     prochlorperazine injection Soln 5 mg, 5 mg, Intravenous, Q6H PRN, Suresh Moise, , 5 mg at 07/24/25 1343    Current Outpatient Medications:     doxylamine succinate (UNISOM, DOXYLAMINE,) 25 mg tablet, Take 1/2 tablet (12.5 mg) in the morning, 1/2 tablet (12.5 mg) in the afternoon, and 1 tablet (25mg) at bedtime., Disp: 60 tablet, Rfl: 3    ferrous sulfate (FEOSOL) 325 mg (65 mg iron) Tab tablet, Take 1 tablet (325 mg total) by mouth every other day., Disp: 15 tablet, Rfl: 3    folic acid (FOLVITE) 1 MG tablet, Take 1 tablet (1 mg total) by mouth every other day.,  Disp: 15 tablet, Rfl: 3    labetaloL (NORMODYNE) 100 MG tablet, Take 1 tablet (100 mg total) by mouth every 12 (twelve) hours. (Patient taking differently: Take 100 mg by mouth once daily.), Disp: 60 tablet, Rfl: 4    prenatal vit/iron fum/folic ac (PRENATAL 1+1 ORAL), Take by mouth., Disp: , Rfl:     promethazine (PHENERGAN) 25 MG tablet, Take 1 tablet (25 mg total) by mouth every 6 (six) hours as needed for Nausea., Disp: 30 tablet, Rfl: 3    PROMETHEGAN 25 mg suppository, SMARTSI SUPPOS Rectally Every 8 Hours PRN, Disp: , Rfl:     pyridoxine, vitamin B6, (B-6) 25 MG Tab, Take 1 tablet (25 mg total) by mouth 3 (three) times daily., Disp: 90 tablet, Rfl: 3  [2]   Social History  Socioeconomic History    Marital status: Single   Tobacco Use    Smoking status: Never     Passive exposure: Never    Smokeless tobacco: Never   Substance and Sexual Activity    Alcohol use: Never     Comment: social    Drug use: Never    Sexual activity: Not Currently     Partners: Male     Social Drivers of Health     Financial Resource Strain: Low Risk  (2025)    Overall Financial Resource Strain (CARDIA)     Difficulty of Paying Living Expenses: Not hard at all   Food Insecurity: No Food Insecurity (2025)    Hunger Vital Sign     Worried About Running Out of Food in the Last Year: Never true     Ran Out of Food in the Last Year: Never true   Transportation Needs: No Transportation Needs (2025)    PRAPARE - Transportation     Lack of Transportation (Medical): No     Lack of Transportation (Non-Medical): No   Physical Activity: Inactive (2025)    Exercise Vital Sign     Days of Exercise per Week: 0 days     Minutes of Exercise per Session: 0 min   Stress: No Stress Concern Present (2025)    [place] [employer] - Occupational Stress Questionnaire     Feeling of Stress : Not at all   Housing Stability: Low Risk  (2025)    Housing Stability Vital Sign     Unable to Pay for Housing in the  Last Year: No     Number of Times Moved in the Last Year: 0     Homeless in the Last Year: No   [3]   No current facility-administered medications on file prior to encounter.     Current Outpatient Medications on File Prior to Encounter   Medication Sig Dispense Refill    doxylamine succinate (UNISOM, DOXYLAMINE,) 25 mg tablet Take 1/2 tablet (12.5 mg) in the morning, 1/2 tablet (12.5 mg) in the afternoon, and 1 tablet (25mg) at bedtime. 60 tablet 3    ferrous sulfate (FEOSOL) 325 mg (65 mg iron) Tab tablet Take 1 tablet (325 mg total) by mouth every other day. 15 tablet 3    folic acid (FOLVITE) 1 MG tablet Take 1 tablet (1 mg total) by mouth every other day. 15 tablet 3    labetaloL (NORMODYNE) 100 MG tablet Take 1 tablet (100 mg total) by mouth every 12 (twelve) hours. (Patient taking differently: Take 100 mg by mouth once daily.) 60 tablet 4    prenatal vit/iron fum/folic ac (PRENATAL 1+1 ORAL) Take by mouth.      promethazine (PHENERGAN) 25 MG tablet Take 1 tablet (25 mg total) by mouth every 6 (six) hours as needed for Nausea. 30 tablet 3    PROMETHEGAN 25 mg suppository SMARTSI SUPPOS Rectally Every 8 Hours PRN      pyridoxine, vitamin B6, (B-6) 25 MG Tab Take 1 tablet (25 mg total) by mouth 3 (three) times daily. 90 tablet 3

## 2025-07-26 LAB — BACTERIA UR CULT: NORMAL

## 2025-07-30 ENCOUNTER — OFFICE VISIT (OUTPATIENT)
Dept: MATERNAL FETAL MEDICINE | Facility: CLINIC | Age: 30
End: 2025-07-30
Payer: MEDICAID

## 2025-07-30 ENCOUNTER — PROCEDURE VISIT (OUTPATIENT)
Dept: MATERNAL FETAL MEDICINE | Facility: CLINIC | Age: 30
End: 2025-07-30
Payer: MEDICAID

## 2025-07-30 VITALS
HEIGHT: 64 IN | BODY MASS INDEX: 30.02 KG/M2 | WEIGHT: 175.81 LBS | DIASTOLIC BLOOD PRESSURE: 89 MMHG | SYSTOLIC BLOOD PRESSURE: 126 MMHG | HEART RATE: 110 BPM

## 2025-07-30 DIAGNOSIS — B00.9 HERPES VIRUS INFECTION IN MOTHER DURING THIRD TRIMESTER OF PREGNANCY: ICD-10-CM

## 2025-07-30 DIAGNOSIS — D25.9 LEIOMYOMA OF UTERUS AFFECTING PREGNANCY IN THIRD TRIMESTER: ICD-10-CM

## 2025-07-30 DIAGNOSIS — O99.213 OBESITY AFFECTING PREGNANCY IN THIRD TRIMESTER, UNSPECIFIED OBESITY TYPE: ICD-10-CM

## 2025-07-30 DIAGNOSIS — D25.9 LEIOMYOMA OF UTERUS AFFECTING PREGNANCY IN SECOND TRIMESTER: ICD-10-CM

## 2025-07-30 DIAGNOSIS — O98.513 HERPES VIRUS INFECTION IN MOTHER DURING THIRD TRIMESTER OF PREGNANCY: ICD-10-CM

## 2025-07-30 DIAGNOSIS — O34.13 LEIOMYOMA OF UTERUS AFFECTING PREGNANCY IN THIRD TRIMESTER: ICD-10-CM

## 2025-07-30 DIAGNOSIS — O10.919 CHRONIC HYPERTENSION DURING PREGNANCY, ANTEPARTUM: Primary | ICD-10-CM

## 2025-07-30 DIAGNOSIS — Z36.89 ENCOUNTER FOR ULTRASOUND TO ASSESS FETAL GROWTH: ICD-10-CM

## 2025-07-30 DIAGNOSIS — O99.212 OBESITY AFFECTING PREGNANCY IN SECOND TRIMESTER, UNSPECIFIED OBESITY TYPE: ICD-10-CM

## 2025-07-30 DIAGNOSIS — O99.013 ANEMIA DURING PREGNANCY IN THIRD TRIMESTER: ICD-10-CM

## 2025-07-30 DIAGNOSIS — O99.012 ANEMIA DURING PREGNANCY IN SECOND TRIMESTER: ICD-10-CM

## 2025-07-30 DIAGNOSIS — O34.219 HISTORY OF CESAREAN SECTION COMPLICATING PREGNANCY: ICD-10-CM

## 2025-07-30 DIAGNOSIS — O34.12 LEIOMYOMA OF UTERUS AFFECTING PREGNANCY IN SECOND TRIMESTER: ICD-10-CM

## 2025-07-30 PROBLEM — O26.892 VAGINAL DISCHARGE DURING PREGNANCY IN SECOND TRIMESTER: Status: RESOLVED | Noted: 2025-07-07 | Resolved: 2025-07-30

## 2025-07-30 PROBLEM — N89.8 VAGINAL DISCHARGE DURING PREGNANCY IN SECOND TRIMESTER: Status: RESOLVED | Noted: 2025-07-07 | Resolved: 2025-07-30

## 2025-07-30 PROBLEM — Z3A.25 PREGNANCY WITH 25 COMPLETED WEEKS GESTATION: Status: RESOLVED | Noted: 2025-07-08 | Resolved: 2025-07-30

## 2025-07-30 PROCEDURE — 76816 OB US FOLLOW-UP PER FETUS: CPT | Mod: S$GLB,,, | Performed by: OBSTETRICS & GYNECOLOGY

## 2025-07-30 PROCEDURE — 99213 OFFICE O/P EST LOW 20 MIN: CPT | Mod: TH,S$GLB,, | Performed by: OBSTETRICS & GYNECOLOGY

## 2025-07-30 PROCEDURE — 3008F BODY MASS INDEX DOCD: CPT | Mod: CPTII,S$GLB,, | Performed by: OBSTETRICS & GYNECOLOGY

## 2025-07-30 PROCEDURE — 3074F SYST BP LT 130 MM HG: CPT | Mod: CPTII,S$GLB,, | Performed by: OBSTETRICS & GYNECOLOGY

## 2025-07-30 PROCEDURE — 1160F RVW MEDS BY RX/DR IN RCRD: CPT | Mod: CPTII,S$GLB,, | Performed by: OBSTETRICS & GYNECOLOGY

## 2025-07-30 PROCEDURE — 1159F MED LIST DOCD IN RCRD: CPT | Mod: CPTII,S$GLB,, | Performed by: OBSTETRICS & GYNECOLOGY

## 2025-07-30 PROCEDURE — 3079F DIAST BP 80-89 MM HG: CPT | Mod: CPTII,S$GLB,, | Performed by: OBSTETRICS & GYNECOLOGY

## 2025-07-30 RX ORDER — LABETALOL 100 MG/1
100 TABLET, FILM COATED ORAL
COMMUNITY

## 2025-07-30 RX ORDER — ASPIRIN 81 MG/1
81 TABLET ORAL DAILY
COMMUNITY

## 2025-07-30 RX ORDER — ACETAMINOPHEN 500 MG
500 TABLET ORAL EVERY 6 HOURS PRN
COMMUNITY

## 2025-08-11 DIAGNOSIS — O34.12 LEIOMYOMA OF UTERUS AFFECTING PREGNANCY IN SECOND TRIMESTER: ICD-10-CM

## 2025-08-11 DIAGNOSIS — O99.212 OBESITY AFFECTING PREGNANCY IN SECOND TRIMESTER, UNSPECIFIED OBESITY TYPE: ICD-10-CM

## 2025-08-11 DIAGNOSIS — D25.9 LEIOMYOMA OF UTERUS AFFECTING PREGNANCY IN SECOND TRIMESTER: ICD-10-CM

## 2025-08-11 DIAGNOSIS — O99.012 ANEMIA DURING PREGNANCY IN SECOND TRIMESTER: Primary | ICD-10-CM

## 2025-08-16 ENCOUNTER — HOSPITAL ENCOUNTER (EMERGENCY)
Facility: HOSPITAL | Age: 30
Discharge: HOME | End: 2025-08-16
Payer: MEDICAID

## 2025-08-16 VITALS
DIASTOLIC BLOOD PRESSURE: 84 MMHG | BODY MASS INDEX: 30.05 KG/M2 | HEART RATE: 109 BPM | RESPIRATION RATE: 18 BRPM | TEMPERATURE: 98 F | WEIGHT: 176 LBS | SYSTOLIC BLOOD PRESSURE: 120 MMHG | HEIGHT: 64 IN | OXYGEN SATURATION: 100 %

## 2025-08-16 LAB
ALBUMIN SERPL-MCNC: 2.6 G/DL (ref 3.5–5)
ALBUMIN/GLOB SERPL: 0.7 RATIO (ref 1.1–2)
ALP SERPL-CCNC: 91 UNIT/L (ref 40–150)
ALT SERPL-CCNC: 9 UNIT/L (ref 0–55)
ANION GAP SERPL CALC-SCNC: 12 MEQ/L
AST SERPL-CCNC: 11 UNIT/L (ref 11–45)
BACTERIA #/AREA URNS AUTO: ABNORMAL /HPF
BASOPHILS # BLD AUTO: 0.02 X10(3)/MCL
BASOPHILS NFR BLD AUTO: 0.2 %
BILIRUB SERPL-MCNC: 0.7 MG/DL
BILIRUB UR QL STRIP.AUTO: NEGATIVE
BUN SERPL-MCNC: 7.9 MG/DL (ref 7–18.7)
CALCIUM SERPL-MCNC: 9.1 MG/DL (ref 8.4–10.2)
CHLORIDE SERPL-SCNC: 102 MMOL/L (ref 98–107)
CLARITY UR: CLEAR
CO2 SERPL-SCNC: 19 MMOL/L (ref 22–29)
COLOR UR AUTO: ABNORMAL
CREAT SERPL-MCNC: 0.57 MG/DL (ref 0.55–1.02)
CREAT/UREA NIT SERPL: 14
EOSINOPHIL # BLD AUTO: 0.04 X10(3)/MCL (ref 0–0.9)
EOSINOPHIL NFR BLD AUTO: 0.4 %
ERYTHROCYTE [DISTWIDTH] IN BLOOD BY AUTOMATED COUNT: 15.6 % (ref 11.5–17)
GFR SERPLBLD CREATININE-BSD FMLA CKD-EPI: >60 ML/MIN/1.73/M2
GLOBULIN SER-MCNC: 4 GM/DL (ref 2.4–3.5)
GLUCOSE SERPL-MCNC: 86 MG/DL (ref 74–100)
GLUCOSE UR QL STRIP: ABNORMAL
HCT VFR BLD AUTO: 29.8 % (ref 37–47)
HGB BLD-MCNC: 9 G/DL (ref 12–16)
HGB UR QL STRIP: NEGATIVE
IMM GRANULOCYTES # BLD AUTO: 0.04 X10(3)/MCL (ref 0–0.04)
IMM GRANULOCYTES NFR BLD AUTO: 0.4 %
KETONES UR QL STRIP: NEGATIVE
LEUKOCYTE ESTERASE UR QL STRIP: NEGATIVE
LYMPHOCYTES # BLD AUTO: 1.83 X10(3)/MCL (ref 0.6–4.6)
LYMPHOCYTES NFR BLD AUTO: 17.2 %
MCH RBC QN AUTO: 21.9 PG (ref 27–31)
MCHC RBC AUTO-ENTMCNC: 30.2 G/DL (ref 33–36)
MCV RBC AUTO: 72.5 FL (ref 80–94)
MONOCYTES # BLD AUTO: 0.88 X10(3)/MCL (ref 0.1–1.3)
MONOCYTES NFR BLD AUTO: 8.3 %
MUCOUS THREADS URNS QL MICRO: ABNORMAL /LPF
NEUTROPHILS # BLD AUTO: 7.81 X10(3)/MCL (ref 2.1–9.2)
NEUTROPHILS NFR BLD AUTO: 73.5 %
NITRITE UR QL STRIP: NEGATIVE
NRBC BLD AUTO-RTO: 0 %
PH UR STRIP: 6 [PH]
PLATELET # BLD AUTO: 225 X10(3)/MCL (ref 130–400)
PMV BLD AUTO: 11.5 FL (ref 7.4–10.4)
POTASSIUM SERPL-SCNC: 3.8 MMOL/L (ref 3.5–5.1)
PROT SERPL-MCNC: 6.6 GM/DL (ref 6.4–8.3)
PROT UR QL STRIP: NEGATIVE
RBC # BLD AUTO: 4.11 X10(6)/MCL (ref 4.2–5.4)
RBC #/AREA URNS AUTO: ABNORMAL /HPF
SODIUM SERPL-SCNC: 133 MMOL/L (ref 136–145)
SP GR UR STRIP.AUTO: 1.02 (ref 1–1.03)
SQUAMOUS #/AREA URNS LPF: ABNORMAL /HPF
UROBILINOGEN UR STRIP-ACNC: NORMAL
WBC # BLD AUTO: 10.62 X10(3)/MCL (ref 4.5–11.5)
WBC #/AREA URNS AUTO: ABNORMAL /HPF

## 2025-08-16 PROCEDURE — 63600175 PHARM REV CODE 636 W HCPCS: Performed by: OBSTETRICS & GYNECOLOGY

## 2025-08-16 PROCEDURE — 85025 COMPLETE CBC W/AUTO DIFF WBC: CPT | Performed by: OBSTETRICS & GYNECOLOGY

## 2025-08-16 PROCEDURE — 80053 COMPREHEN METABOLIC PANEL: CPT | Performed by: OBSTETRICS & GYNECOLOGY

## 2025-08-16 PROCEDURE — 81001 URINALYSIS AUTO W/SCOPE: CPT | Performed by: OBSTETRICS & GYNECOLOGY

## 2025-08-16 PROCEDURE — 96360 HYDRATION IV INFUSION INIT: CPT

## 2025-08-16 PROCEDURE — 99284 EMERGENCY DEPT VISIT MOD MDM: CPT | Mod: 25

## 2025-08-16 RX ORDER — TERBUTALINE SULFATE 1 MG/ML
0.25 INJECTION SUBCUTANEOUS ONCE
Status: DISCONTINUED | OUTPATIENT
Start: 2025-08-16 | End: 2025-08-17 | Stop reason: HOSPADM

## 2025-08-16 RX ADMIN — SODIUM CHLORIDE, POTASSIUM CHLORIDE, SODIUM LACTATE AND CALCIUM CHLORIDE 1000 ML: 600; 310; 30; 20 INJECTION, SOLUTION INTRAVENOUS at 08:08

## 2025-08-20 ENCOUNTER — PROCEDURE VISIT (OUTPATIENT)
Dept: MATERNAL FETAL MEDICINE | Facility: CLINIC | Age: 30
End: 2025-08-20
Payer: MEDICAID

## 2025-08-20 ENCOUNTER — LAB VISIT (OUTPATIENT)
Dept: LAB | Facility: HOSPITAL | Age: 30
End: 2025-08-20
Attending: OBSTETRICS & GYNECOLOGY
Payer: MEDICAID

## 2025-08-20 ENCOUNTER — OFFICE VISIT (OUTPATIENT)
Dept: MATERNAL FETAL MEDICINE | Facility: CLINIC | Age: 30
End: 2025-08-20
Payer: MEDICAID

## 2025-08-20 VITALS
WEIGHT: 178.5 LBS | HEART RATE: 110 BPM | HEIGHT: 64 IN | BODY MASS INDEX: 30.48 KG/M2 | SYSTOLIC BLOOD PRESSURE: 116 MMHG | DIASTOLIC BLOOD PRESSURE: 81 MMHG

## 2025-08-20 DIAGNOSIS — O34.219 HISTORY OF CESAREAN SECTION COMPLICATING PREGNANCY: ICD-10-CM

## 2025-08-20 DIAGNOSIS — O99.013 ANEMIA DURING PREGNANCY IN THIRD TRIMESTER: ICD-10-CM

## 2025-08-20 DIAGNOSIS — O99.012 ANEMIA DURING PREGNANCY IN SECOND TRIMESTER: ICD-10-CM

## 2025-08-20 DIAGNOSIS — O10.919 CHRONIC HYPERTENSION DURING PREGNANCY, ANTEPARTUM: Primary | ICD-10-CM

## 2025-08-20 DIAGNOSIS — O34.13 LEIOMYOMA OF UTERUS AFFECTING PREGNANCY IN THIRD TRIMESTER: ICD-10-CM

## 2025-08-20 DIAGNOSIS — D25.9 LEIOMYOMA OF UTERUS AFFECTING PREGNANCY IN THIRD TRIMESTER: ICD-10-CM

## 2025-08-20 DIAGNOSIS — D25.9 LEIOMYOMA OF UTERUS AFFECTING PREGNANCY IN SECOND TRIMESTER: ICD-10-CM

## 2025-08-20 DIAGNOSIS — O99.212 OBESITY AFFECTING PREGNANCY IN SECOND TRIMESTER, UNSPECIFIED OBESITY TYPE: ICD-10-CM

## 2025-08-20 DIAGNOSIS — O34.12 LEIOMYOMA OF UTERUS AFFECTING PREGNANCY IN SECOND TRIMESTER: ICD-10-CM

## 2025-08-20 DIAGNOSIS — O99.213 OBESITY AFFECTING PREGNANCY IN THIRD TRIMESTER, UNSPECIFIED OBESITY TYPE: ICD-10-CM

## 2025-08-20 PROBLEM — O21.0 HYPEREMESIS GRAVIDARUM: Status: RESOLVED | Noted: 2025-04-08 | Resolved: 2025-08-20

## 2025-08-20 LAB
FERRITIN SERPL-MCNC: 6.71 NG/ML (ref 4.63–204)
IRON SATN MFR SERPL: 8 % (ref 20–50)
IRON SERPL-MCNC: 35 UG/DL (ref 50–170)
TIBC SERPL-MCNC: 418 UG/DL (ref 70–310)
TIBC SERPL-MCNC: 453 UG/DL (ref 250–450)
TRANSFERRIN SERPL-MCNC: 419 MG/DL (ref 180–382)

## 2025-08-20 PROCEDURE — 99214 OFFICE O/P EST MOD 30 MIN: CPT | Mod: TH,S$GLB,, | Performed by: OBSTETRICS & GYNECOLOGY

## 2025-08-20 PROCEDURE — 3074F SYST BP LT 130 MM HG: CPT | Mod: CPTII,S$GLB,, | Performed by: OBSTETRICS & GYNECOLOGY

## 2025-08-20 PROCEDURE — 82728 ASSAY OF FERRITIN: CPT

## 2025-08-20 PROCEDURE — 1160F RVW MEDS BY RX/DR IN RCRD: CPT | Mod: CPTII,S$GLB,, | Performed by: OBSTETRICS & GYNECOLOGY

## 2025-08-20 PROCEDURE — 3008F BODY MASS INDEX DOCD: CPT | Mod: CPTII,S$GLB,, | Performed by: OBSTETRICS & GYNECOLOGY

## 2025-08-20 PROCEDURE — 83550 IRON BINDING TEST: CPT

## 2025-08-20 PROCEDURE — 3079F DIAST BP 80-89 MM HG: CPT | Mod: CPTII,S$GLB,, | Performed by: OBSTETRICS & GYNECOLOGY

## 2025-08-20 PROCEDURE — 1159F MED LIST DOCD IN RCRD: CPT | Mod: CPTII,S$GLB,, | Performed by: OBSTETRICS & GYNECOLOGY

## 2025-08-20 PROCEDURE — 36415 COLL VENOUS BLD VENIPUNCTURE: CPT

## 2025-08-20 RX ORDER — TERCONAZOLE 8 MG/G
CREAM VAGINAL
COMMUNITY
Start: 2025-08-19

## 2025-08-25 DIAGNOSIS — O34.219 HISTORY OF CESAREAN SECTION COMPLICATING PREGNANCY: ICD-10-CM

## 2025-08-25 DIAGNOSIS — O34.13 LEIOMYOMA OF UTERUS AFFECTING PREGNANCY IN THIRD TRIMESTER: ICD-10-CM

## 2025-08-25 DIAGNOSIS — O10.919 CHRONIC HYPERTENSION DURING PREGNANCY, ANTEPARTUM: Primary | ICD-10-CM

## 2025-08-25 DIAGNOSIS — O99.012 ANEMIA DURING PREGNANCY IN SECOND TRIMESTER: ICD-10-CM

## 2025-08-25 DIAGNOSIS — O99.013 ANEMIA DURING PREGNANCY IN THIRD TRIMESTER: ICD-10-CM

## 2025-08-25 DIAGNOSIS — D25.9 LEIOMYOMA OF UTERUS AFFECTING PREGNANCY IN THIRD TRIMESTER: ICD-10-CM

## 2025-08-26 ENCOUNTER — HOSPITAL ENCOUNTER (EMERGENCY)
Facility: HOSPITAL | Age: 30
Discharge: HOME | End: 2025-08-27
Payer: MEDICAID

## 2025-08-26 PROCEDURE — 99284 EMERGENCY DEPT VISIT MOD MDM: CPT

## 2025-08-26 RX ORDER — DEXTROSE, SODIUM CHLORIDE, SODIUM LACTATE, POTASSIUM CHLORIDE, AND CALCIUM CHLORIDE 5; .6; .31; .03; .02 G/100ML; G/100ML; G/100ML; G/100ML; G/100ML
INJECTION, SOLUTION INTRAVENOUS
Status: COMPLETED | OUTPATIENT
Start: 2025-08-27 | End: 2025-08-27

## 2025-08-27 ENCOUNTER — PROCEDURE VISIT (OUTPATIENT)
Dept: MATERNAL FETAL MEDICINE | Facility: CLINIC | Age: 30
End: 2025-08-27
Payer: MEDICAID

## 2025-08-27 ENCOUNTER — OFFICE VISIT (OUTPATIENT)
Dept: MATERNAL FETAL MEDICINE | Facility: CLINIC | Age: 30
End: 2025-08-27
Payer: MEDICAID

## 2025-08-27 VITALS
BODY MASS INDEX: 30.73 KG/M2 | DIASTOLIC BLOOD PRESSURE: 89 MMHG | SYSTOLIC BLOOD PRESSURE: 110 MMHG | HEIGHT: 64 IN | HEART RATE: 129 BPM | WEIGHT: 180 LBS

## 2025-08-27 VITALS
OXYGEN SATURATION: 100 % | TEMPERATURE: 98 F | HEART RATE: 110 BPM | DIASTOLIC BLOOD PRESSURE: 76 MMHG | SYSTOLIC BLOOD PRESSURE: 115 MMHG

## 2025-08-27 DIAGNOSIS — B00.9 HERPES VIRUS INFECTION IN MOTHER DURING THIRD TRIMESTER OF PREGNANCY: ICD-10-CM

## 2025-08-27 DIAGNOSIS — O99.213 OBESITY AFFECTING PREGNANCY IN THIRD TRIMESTER, UNSPECIFIED OBESITY TYPE: ICD-10-CM

## 2025-08-27 DIAGNOSIS — O99.012 ANEMIA DURING PREGNANCY IN SECOND TRIMESTER: ICD-10-CM

## 2025-08-27 DIAGNOSIS — O10.919 CHRONIC HYPERTENSION DURING PREGNANCY, ANTEPARTUM: Primary | ICD-10-CM

## 2025-08-27 DIAGNOSIS — O34.12 LEIOMYOMA OF UTERUS AFFECTING PREGNANCY IN SECOND TRIMESTER: ICD-10-CM

## 2025-08-27 DIAGNOSIS — D25.9 LEIOMYOMA OF UTERUS AFFECTING PREGNANCY IN SECOND TRIMESTER: ICD-10-CM

## 2025-08-27 DIAGNOSIS — O99.212 OBESITY AFFECTING PREGNANCY IN SECOND TRIMESTER, UNSPECIFIED OBESITY TYPE: ICD-10-CM

## 2025-08-27 DIAGNOSIS — O34.219 HISTORY OF CESAREAN SECTION COMPLICATING PREGNANCY: ICD-10-CM

## 2025-08-27 DIAGNOSIS — O34.13 LEIOMYOMA OF UTERUS AFFECTING PREGNANCY IN THIRD TRIMESTER: ICD-10-CM

## 2025-08-27 DIAGNOSIS — D25.9 LEIOMYOMA OF UTERUS AFFECTING PREGNANCY IN THIRD TRIMESTER: ICD-10-CM

## 2025-08-27 DIAGNOSIS — O99.013 ANEMIA DURING PREGNANCY IN THIRD TRIMESTER: ICD-10-CM

## 2025-08-27 DIAGNOSIS — O98.513 HERPES VIRUS INFECTION IN MOTHER DURING THIRD TRIMESTER OF PREGNANCY: ICD-10-CM

## 2025-08-27 PROCEDURE — 3079F DIAST BP 80-89 MM HG: CPT | Mod: CPTII,S$GLB,, | Performed by: OBSTETRICS & GYNECOLOGY

## 2025-08-27 PROCEDURE — 63600175 PHARM REV CODE 636 W HCPCS: Performed by: SPECIALIST

## 2025-08-27 PROCEDURE — 3008F BODY MASS INDEX DOCD: CPT | Mod: CPTII,S$GLB,, | Performed by: OBSTETRICS & GYNECOLOGY

## 2025-08-27 PROCEDURE — 76816 OB US FOLLOW-UP PER FETUS: CPT | Mod: S$GLB,,, | Performed by: OBSTETRICS & GYNECOLOGY

## 2025-08-27 PROCEDURE — 1159F MED LIST DOCD IN RCRD: CPT | Mod: CPTII,S$GLB,, | Performed by: OBSTETRICS & GYNECOLOGY

## 2025-08-27 PROCEDURE — 3074F SYST BP LT 130 MM HG: CPT | Mod: CPTII,S$GLB,, | Performed by: OBSTETRICS & GYNECOLOGY

## 2025-08-27 PROCEDURE — 1160F RVW MEDS BY RX/DR IN RCRD: CPT | Mod: CPTII,S$GLB,, | Performed by: OBSTETRICS & GYNECOLOGY

## 2025-08-27 PROCEDURE — 99213 OFFICE O/P EST LOW 20 MIN: CPT | Mod: TH,S$GLB,, | Performed by: OBSTETRICS & GYNECOLOGY

## 2025-08-27 PROCEDURE — 76819 FETAL BIOPHYS PROFIL W/O NST: CPT | Mod: S$GLB,,, | Performed by: OBSTETRICS & GYNECOLOGY

## 2025-08-27 RX ADMIN — SODIUM CHLORIDE, SODIUM LACTATE, POTASSIUM CHLORIDE, CALCIUM CHLORIDE AND DEXTROSE MONOHYDRATE: 5; 600; 310; 30; 20 INJECTION, SOLUTION INTRAVENOUS at 12:08

## 2025-09-04 ENCOUNTER — OFFICE VISIT (OUTPATIENT)
Dept: MATERNAL FETAL MEDICINE | Facility: CLINIC | Age: 30
End: 2025-09-04
Payer: MEDICAID

## 2025-09-04 ENCOUNTER — PROCEDURE VISIT (OUTPATIENT)
Dept: MATERNAL FETAL MEDICINE | Facility: CLINIC | Age: 30
End: 2025-09-04
Payer: MEDICAID

## 2025-09-04 VITALS
WEIGHT: 178.63 LBS | SYSTOLIC BLOOD PRESSURE: 130 MMHG | DIASTOLIC BLOOD PRESSURE: 85 MMHG | HEART RATE: 105 BPM | HEIGHT: 64 IN | BODY MASS INDEX: 30.5 KG/M2

## 2025-09-04 DIAGNOSIS — O34.13 LEIOMYOMA OF UTERUS AFFECTING PREGNANCY IN THIRD TRIMESTER: ICD-10-CM

## 2025-09-04 DIAGNOSIS — O34.219 HISTORY OF CESAREAN SECTION COMPLICATING PREGNANCY: ICD-10-CM

## 2025-09-04 DIAGNOSIS — O99.013 ANEMIA DURING PREGNANCY IN THIRD TRIMESTER: ICD-10-CM

## 2025-09-04 DIAGNOSIS — O99.213 OBESITY AFFECTING PREGNANCY IN THIRD TRIMESTER, UNSPECIFIED OBESITY TYPE: ICD-10-CM

## 2025-09-04 DIAGNOSIS — O10.919 CHRONIC HYPERTENSION DURING PREGNANCY, ANTEPARTUM: ICD-10-CM

## 2025-09-04 DIAGNOSIS — D25.9 LEIOMYOMA OF UTERUS AFFECTING PREGNANCY IN THIRD TRIMESTER: ICD-10-CM

## 2025-09-04 DIAGNOSIS — O99.012 ANEMIA DURING PREGNANCY IN SECOND TRIMESTER: ICD-10-CM

## 2025-09-04 DIAGNOSIS — O10.919 CHRONIC HYPERTENSION DURING PREGNANCY, ANTEPARTUM: Primary | ICD-10-CM

## 2025-09-04 PROCEDURE — 76819 FETAL BIOPHYS PROFIL W/O NST: CPT | Mod: S$GLB,,, | Performed by: OBSTETRICS & GYNECOLOGY

## 2025-09-04 PROCEDURE — 99213 OFFICE O/P EST LOW 20 MIN: CPT | Mod: TH,S$GLB,, | Performed by: OBSTETRICS & GYNECOLOGY

## 2025-09-04 PROCEDURE — 3008F BODY MASS INDEX DOCD: CPT | Mod: CPTII,S$GLB,, | Performed by: OBSTETRICS & GYNECOLOGY

## 2025-09-04 PROCEDURE — 3075F SYST BP GE 130 - 139MM HG: CPT | Mod: CPTII,S$GLB,, | Performed by: OBSTETRICS & GYNECOLOGY

## 2025-09-04 PROCEDURE — 3079F DIAST BP 80-89 MM HG: CPT | Mod: CPTII,S$GLB,, | Performed by: OBSTETRICS & GYNECOLOGY

## 2025-09-04 PROCEDURE — 1159F MED LIST DOCD IN RCRD: CPT | Mod: CPTII,S$GLB,, | Performed by: OBSTETRICS & GYNECOLOGY

## 2025-09-04 PROCEDURE — 1160F RVW MEDS BY RX/DR IN RCRD: CPT | Mod: CPTII,S$GLB,, | Performed by: OBSTETRICS & GYNECOLOGY

## 2025-09-04 RX ORDER — VALACYCLOVIR HYDROCHLORIDE 500 MG/1
500 TABLET, FILM COATED ORAL 2 TIMES DAILY
Qty: 60 TABLET | Refills: 1 | Status: SHIPPED | OUTPATIENT
Start: 2025-09-04 | End: 2025-11-03

## 2025-09-04 RX ORDER — OMEPRAZOLE 40 MG/1
40 CAPSULE, DELAYED RELEASE ORAL
COMMUNITY
Start: 2025-08-26